# Patient Record
Sex: FEMALE | Race: BLACK OR AFRICAN AMERICAN | NOT HISPANIC OR LATINO | Employment: FULL TIME | URBAN - METROPOLITAN AREA
[De-identification: names, ages, dates, MRNs, and addresses within clinical notes are randomized per-mention and may not be internally consistent; named-entity substitution may affect disease eponyms.]

---

## 2023-11-29 DIAGNOSIS — Z76.0 MEDICATION REFILL: ICD-10-CM

## 2023-11-29 RX ORDER — IBUPROFEN 800 MG/1
800 TABLET ORAL EVERY 8 HOURS
COMMUNITY
Start: 2013-02-15 | End: 2023-11-29 | Stop reason: SDUPTHER

## 2023-11-29 RX ORDER — IBUPROFEN 800 MG/1
800 TABLET ORAL EVERY 8 HOURS
Qty: 90 TABLET | Refills: 3 | Status: SHIPPED | OUTPATIENT
Start: 2023-11-29 | End: 2024-11-28

## 2023-11-29 NOTE — TELEPHONE ENCOUNTER
Pt lvm requesting rx refill on ibuprofen 800 also requesting something for indigestion last ov 7/6/2023

## 2023-12-11 ENCOUNTER — TELEPHONE (OUTPATIENT)
Dept: PRIMARY CARE | Facility: CLINIC | Age: 43
End: 2023-12-11

## 2023-12-11 DIAGNOSIS — M54.50 ACUTE LOW BACK PAIN, UNSPECIFIED BACK PAIN LATERALITY, UNSPECIFIED WHETHER SCIATICA PRESENT: Primary | ICD-10-CM

## 2023-12-11 RX ORDER — CYCLOBENZAPRINE HCL 10 MG
10 TABLET ORAL NIGHTLY PRN
Qty: 30 TABLET | Refills: 1 | Status: SHIPPED | OUTPATIENT
Start: 2023-12-11 | End: 2024-02-28

## 2023-12-11 NOTE — TELEPHONE ENCOUNTER
PT wants to see if Dr. Ramírez could send a refill on her flexeril or send a different muscle relaxer.  Urgent care gave her a muscle relaxer and steroid, and she is not having any relief after 4 days. Please advise.

## 2023-12-29 ENCOUNTER — OFFICE VISIT (OUTPATIENT)
Dept: PRIMARY CARE | Facility: CLINIC | Age: 43
End: 2023-12-29
Payer: COMMERCIAL

## 2023-12-29 VITALS
BODY MASS INDEX: 48.3 KG/M2 | SYSTOLIC BLOOD PRESSURE: 126 MMHG | DIASTOLIC BLOOD PRESSURE: 78 MMHG | TEMPERATURE: 96.7 F | WEIGHT: 293 LBS | HEART RATE: 80 BPM | OXYGEN SATURATION: 99 %

## 2023-12-29 DIAGNOSIS — F41.9 ANXIETY: Primary | ICD-10-CM

## 2023-12-29 DIAGNOSIS — Z13.6 SCREENING FOR HEART DISEASE: ICD-10-CM

## 2023-12-29 PROCEDURE — 3008F BODY MASS INDEX DOCD: CPT | Performed by: INTERNAL MEDICINE

## 2023-12-29 PROCEDURE — 99214 OFFICE O/P EST MOD 30 MIN: CPT | Performed by: INTERNAL MEDICINE

## 2023-12-29 PROCEDURE — 1036F TOBACCO NON-USER: CPT | Performed by: INTERNAL MEDICINE

## 2023-12-29 RX ORDER — BUSPIRONE HYDROCHLORIDE 7.5 MG/1
7.5 TABLET ORAL EVERY 12 HOURS
COMMUNITY
Start: 2023-07-06 | End: 2023-12-29 | Stop reason: DRUGHIGH

## 2023-12-29 RX ORDER — BUSPIRONE HYDROCHLORIDE 10 MG/1
10 TABLET ORAL 2 TIMES DAILY
Qty: 60 TABLET | Refills: 3 | Status: SHIPPED | OUTPATIENT
Start: 2023-12-29 | End: 2024-01-26 | Stop reason: DRUGHIGH

## 2023-12-29 ASSESSMENT — PAIN SCALES - GENERAL: PAINLEVEL: 3

## 2023-12-29 NOTE — PROGRESS NOTES
Subjective   Patient ID: Caity Pastrana is a 43 y.o. female who presents for Follow-up.    This is a 43 y.o. presenting for follow up of anxiety. She would like to increase her dose of Buspirone. She states that she worries about her finances and her children often. She is seeing a therapist and feels this is helping.  Pt also states that she had midsternal CP and went to the  for evaluation. She had an EKG that was normal. She denies burning CP. No diaphoresis, SOB, nausea, vomiting, dizziness.         Review of Systems   All other systems reviewed and are negative.      Objective   /78   Pulse 80   Temp 35.9 °C (96.7 °F)   Wt 142 kg (313 lb)   SpO2 99%   BMI 48.30 kg/m²     Physical Exam  Vitals reviewed.   Constitutional:       Appearance: Normal appearance.   Cardiovascular:      Rate and Rhythm: Normal rate and regular rhythm.      Heart sounds: Normal heart sounds.   Pulmonary:      Effort: Pulmonary effort is normal.      Breath sounds: Normal breath sounds.   Skin:     General: Skin is warm and dry.   Neurological:      General: No focal deficit present.      Mental Status: She is alert and oriented to person, place, and time.   Psychiatric:         Mood and Affect: Mood normal.         Behavior: Behavior normal.         Thought Content: Thought content normal.         Judgment: Judgment normal.         Assessment/Plan   Diagnoses and all orders for this visit:  Anxiety  -     busPIRone (Buspar) 10 mg tablet; Take 1 tablet (10 mg) by mouth 2 times a day.  Screening for heart disease  -     CT cardiac scoring wo IV contrast; Future

## 2024-01-12 ENCOUNTER — ANCILLARY PROCEDURE (OUTPATIENT)
Dept: RADIOLOGY | Facility: CLINIC | Age: 44
End: 2024-01-12
Payer: COMMERCIAL

## 2024-01-12 DIAGNOSIS — Z13.6 SCREENING FOR HEART DISEASE: ICD-10-CM

## 2024-01-12 PROCEDURE — 75571 CT HRT W/O DYE W/CA TEST: CPT

## 2024-01-23 ENCOUNTER — E-VISIT (OUTPATIENT)
Dept: PRIMARY CARE | Facility: CLINIC | Age: 44
End: 2024-01-23
Payer: COMMERCIAL

## 2024-01-23 NOTE — TELEPHONE ENCOUNTER
Calf pain and being sedentary is concerning for blood clots. You may need an ultrasound, but you would need in person evaluation.

## 2024-01-24 ENCOUNTER — TELEPHONE (OUTPATIENT)
Dept: OBSTETRICS AND GYNECOLOGY | Facility: CLINIC | Age: 44
End: 2024-01-24

## 2024-01-24 ENCOUNTER — OFFICE VISIT (OUTPATIENT)
Dept: PRIMARY CARE | Facility: CLINIC | Age: 44
End: 2024-01-24
Payer: COMMERCIAL

## 2024-01-24 ENCOUNTER — HOSPITAL ENCOUNTER (OUTPATIENT)
Dept: RADIOLOGY | Facility: HOSPITAL | Age: 44
Discharge: HOME | End: 2024-01-24
Payer: COMMERCIAL

## 2024-01-24 VITALS
OXYGEN SATURATION: 99 % | BODY MASS INDEX: 49.07 KG/M2 | HEART RATE: 78 BPM | TEMPERATURE: 97.1 F | DIASTOLIC BLOOD PRESSURE: 78 MMHG | WEIGHT: 293 LBS | SYSTOLIC BLOOD PRESSURE: 126 MMHG

## 2024-01-24 DIAGNOSIS — M79.661 BILATERAL CALF PAIN: ICD-10-CM

## 2024-01-24 DIAGNOSIS — M79.661 BILATERAL CALF PAIN: Primary | ICD-10-CM

## 2024-01-24 DIAGNOSIS — M79.662 BILATERAL CALF PAIN: ICD-10-CM

## 2024-01-24 DIAGNOSIS — M79.662 BILATERAL CALF PAIN: Primary | ICD-10-CM

## 2024-01-24 PROCEDURE — 99213 OFFICE O/P EST LOW 20 MIN: CPT | Performed by: INTERNAL MEDICINE

## 2024-01-24 PROCEDURE — 93970 EXTREMITY STUDY: CPT

## 2024-01-24 PROCEDURE — 3008F BODY MASS INDEX DOCD: CPT | Performed by: INTERNAL MEDICINE

## 2024-01-24 PROCEDURE — 1036F TOBACCO NON-USER: CPT | Performed by: INTERNAL MEDICINE

## 2024-01-24 ASSESSMENT — PAIN SCALES - GENERAL: PAINLEVEL: 0-NO PAIN

## 2024-01-24 ASSESSMENT — PATIENT HEALTH QUESTIONNAIRE - PHQ9
1. LITTLE INTEREST OR PLEASURE IN DOING THINGS: NOT AT ALL
2. FEELING DOWN, DEPRESSED OR HOPELESS: NOT AT ALL
SUM OF ALL RESPONSES TO PHQ9 QUESTIONS 1 AND 2: 0

## 2024-01-24 NOTE — TELEPHONE ENCOUNTER
"Est pt called c/o bloating, lower back/pelvic pain, \"period like sxs\" also dark/spot/discharge, pt states she feels like she's on her period but its not a period and she hasn't had one in over a year. Pt states she assumed she was post carlyle, Pt states been consistent last 2 weeks. MA made appt for follow up for 2/28 with SG, pt advised to monitor and keep track of everything in the meantime, excessive vb/pain advised ER. Please advise  "

## 2024-01-24 NOTE — PROGRESS NOTES
Subjective   Patient ID: Caity Pastrana is a 43 y.o. female who presents for calves check for blood clots.    This is a 43 y.o. presenting with burning pain in her calves since last week. 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 Pt denies CP, SOB, leg swelling.         Review of Systems   All other systems reviewed and are negative.      Objective   /78   Pulse 78   Temp 36.2 °C (97.1 °F)   Wt 144 kg (318 lb)   SpO2 99%   BMI 49.07 kg/m²     Physical Exam  Constitutional:       Appearance: Normal appearance.   Cardiovascular:      Rate and Rhythm: Normal rate and regular rhythm.   Pulmonary:      Effort: Pulmonary effort is normal.      Breath sounds: Normal breath sounds.   Musculoskeletal:         General: Tenderness present. No swelling.      Comments: Tenderness in lateral aspect of right calf   Neurological:      General: No focal deficit present.      Mental Status: She is alert and oriented to person, place, and time.   Psychiatric:         Mood and Affect: Mood normal.         Assessment/Plan   Diagnoses and all orders for this visit:  Bilateral calf pain  -     Vascular US lower extremity venous duplex bilateral; Future

## 2024-01-26 ENCOUNTER — E-VISIT (OUTPATIENT)
Dept: PRIMARY CARE | Facility: CLINIC | Age: 44
End: 2024-01-26
Payer: COMMERCIAL

## 2024-01-26 DIAGNOSIS — F41.9 ANXIETY: Primary | ICD-10-CM

## 2024-01-26 DIAGNOSIS — J30.2 SEASONAL ALLERGIES: ICD-10-CM

## 2024-01-26 RX ORDER — AZELASTINE 1 MG/ML
1 SPRAY, METERED NASAL 2 TIMES DAILY PRN
Qty: 30 ML | Refills: 5 | Status: SHIPPED | OUTPATIENT
Start: 2024-01-26 | End: 2025-01-25

## 2024-01-26 RX ORDER — BUSPIRONE HYDROCHLORIDE 7.5 MG/1
7.5 TABLET ORAL 2 TIMES DAILY
Qty: 60 TABLET | Refills: 3 | Status: SHIPPED | OUTPATIENT
Start: 2024-01-26 | End: 2025-01-25

## 2024-02-16 PROCEDURE — 87086 URINE CULTURE/COLONY COUNT: CPT

## 2024-02-17 ENCOUNTER — LAB REQUISITION (OUTPATIENT)
Dept: LAB | Facility: HOSPITAL | Age: 44
End: 2024-02-17
Payer: COMMERCIAL

## 2024-02-17 DIAGNOSIS — R30.0 DYSURIA: ICD-10-CM

## 2024-02-18 LAB — BACTERIA UR CULT: NORMAL

## 2024-02-23 ENCOUNTER — E-VISIT (OUTPATIENT)
Dept: PRIMARY CARE | Facility: CLINIC | Age: 44
End: 2024-02-23
Payer: COMMERCIAL

## 2024-02-23 DIAGNOSIS — J01.90 ACUTE NON-RECURRENT SINUSITIS, UNSPECIFIED LOCATION: Primary | ICD-10-CM

## 2024-02-23 RX ORDER — CEPHALEXIN 500 MG/1
500 CAPSULE ORAL 2 TIMES DAILY
Qty: 20 CAPSULE | Refills: 0 | Status: SHIPPED | OUTPATIENT
Start: 2024-02-23 | End: 2024-03-05

## 2024-02-27 ASSESSMENT — ENCOUNTER SYMPTOMS
CHILLS: 0
FEVER: 0
HEMATURIA: 0
ANOREXIA: 0
ABDOMINAL PAIN: 0
BACK PAIN: 0
DIARRHEA: 0
FREQUENCY: 0
CONSTIPATION: 0
NAUSEA: 0
HEADACHES: 1
VOMITING: 0
SORE THROAT: 0
FLANK PAIN: 0
DYSURIA: 1

## 2024-02-28 ENCOUNTER — OFFICE VISIT (OUTPATIENT)
Dept: OBSTETRICS AND GYNECOLOGY | Facility: CLINIC | Age: 44
End: 2024-02-28
Payer: COMMERCIAL

## 2024-02-28 ENCOUNTER — HOSPITAL ENCOUNTER (OUTPATIENT)
Dept: RADIOLOGY | Facility: CLINIC | Age: 44
Discharge: HOME | End: 2024-02-28
Payer: COMMERCIAL

## 2024-02-28 ENCOUNTER — LAB (OUTPATIENT)
Dept: LAB | Facility: LAB | Age: 44
End: 2024-02-28
Payer: COMMERCIAL

## 2024-02-28 VITALS
BODY MASS INDEX: 44.41 KG/M2 | SYSTOLIC BLOOD PRESSURE: 118 MMHG | WEIGHT: 293 LBS | DIASTOLIC BLOOD PRESSURE: 84 MMHG | HEIGHT: 68 IN

## 2024-02-28 DIAGNOSIS — N95.0 PMB (POSTMENOPAUSAL BLEEDING): Primary | ICD-10-CM

## 2024-02-28 DIAGNOSIS — Z01.411 ENCOUNTER FOR GYNECOLOGICAL EXAMINATION WITH ABNORMAL FINDING: ICD-10-CM

## 2024-02-28 DIAGNOSIS — Z80.3 FAMILY HISTORY OF BREAST CANCER: ICD-10-CM

## 2024-02-28 DIAGNOSIS — N95.0 PMB (POSTMENOPAUSAL BLEEDING): ICD-10-CM

## 2024-02-28 DIAGNOSIS — Z13.79 ENCOUNTER FOR OTHER SCREENING FOR GENETIC AND CHROMOSOMAL ANOMALIES: ICD-10-CM

## 2024-02-28 DIAGNOSIS — Z11.51 ENCOUNTER FOR SCREENING FOR HUMAN PAPILLOMAVIRUS (HPV): ICD-10-CM

## 2024-02-28 DIAGNOSIS — Z80.41 FAMILY HISTORY OF OVARIAN CANCER: ICD-10-CM

## 2024-02-28 DIAGNOSIS — Z12.4 CERVICAL CANCER SCREENING: ICD-10-CM

## 2024-02-28 DIAGNOSIS — Z12.31 ENCOUNTER FOR SCREENING MAMMOGRAM FOR MALIGNANT NEOPLASM OF BREAST: ICD-10-CM

## 2024-02-28 LAB — TSH SERPL DL<=0.05 MIU/L-ACNC: 1.35 MIU/L (ref 0.27–4.2)

## 2024-02-28 PROCEDURE — 76856 US EXAM PELVIC COMPLETE: CPT

## 2024-02-28 PROCEDURE — 84443 ASSAY THYROID STIM HORMONE: CPT

## 2024-02-28 PROCEDURE — 83002 ASSAY OF GONADOTROPIN (LH): CPT

## 2024-02-28 PROCEDURE — 1036F TOBACCO NON-USER: CPT | Performed by: OBSTETRICS & GYNECOLOGY

## 2024-02-28 PROCEDURE — 88175 CYTOPATH C/V AUTO FLUID REDO: CPT | Mod: TC,GCY | Performed by: OBSTETRICS & GYNECOLOGY

## 2024-02-28 PROCEDURE — 87205 SMEAR GRAM STAIN: CPT | Mod: WESLAB | Performed by: OBSTETRICS & GYNECOLOGY

## 2024-02-28 PROCEDURE — 36415 COLL VENOUS BLD VENIPUNCTURE: CPT

## 2024-02-28 PROCEDURE — 87624 HPV HI-RISK TYP POOLED RSLT: CPT | Performed by: OBSTETRICS & GYNECOLOGY

## 2024-02-28 PROCEDURE — 83001 ASSAY OF GONADOTROPIN (FSH): CPT

## 2024-02-28 PROCEDURE — 99396 PREV VISIT EST AGE 40-64: CPT | Performed by: OBSTETRICS & GYNECOLOGY

## 2024-02-28 PROCEDURE — 99213 OFFICE O/P EST LOW 20 MIN: CPT | Performed by: OBSTETRICS & GYNECOLOGY

## 2024-02-28 RX ORDER — MISOPROSTOL 200 UG/1
200 TABLET ORAL ONCE
Qty: 1 TABLET | Refills: 0 | Status: SHIPPED | OUTPATIENT
Start: 2024-02-28 | End: 2024-03-05

## 2024-02-28 RX ORDER — LIRAGLUTIDE 6 MG/ML
1.8 INJECTION SUBCUTANEOUS
COMMUNITY
Start: 2023-11-10 | End: 2024-11-09

## 2024-02-28 ASSESSMENT — ENCOUNTER SYMPTOMS
LOSS OF SENSATION IN FEET: 0
OCCASIONAL FEELINGS OF UNSTEADINESS: 0
DEPRESSION: 0

## 2024-02-28 ASSESSMENT — PATIENT HEALTH QUESTIONNAIRE - PHQ9
1. LITTLE INTEREST OR PLEASURE IN DOING THINGS: NOT AT ALL
SUM OF ALL RESPONSES TO PHQ9 QUESTIONS 1 & 2: 0
2. FEELING DOWN, DEPRESSED OR HOPELESS: NOT AT ALL

## 2024-02-28 ASSESSMENT — PAIN SCALES - GENERAL: PAINLEVEL: 1

## 2024-02-28 ASSESSMENT — LIFESTYLE VARIABLES
HOW OFTEN DO YOU HAVE A DRINK CONTAINING ALCOHOL: 2-4 TIMES A MONTH
HOW OFTEN DO YOU HAVE SIX OR MORE DRINKS ON ONE OCCASION: NEVER
HOW MANY STANDARD DRINKS CONTAINING ALCOHOL DO YOU HAVE ON A TYPICAL DAY: 1 OR 2
AUDIT-C TOTAL SCORE: 2
SKIP TO QUESTIONS 9-10: 1

## 2024-02-28 NOTE — PROGRESS NOTES
GYN OFFICE VISIT    Patient Name:  Caity Pastrana  :  1980  MR #:  [id number]  Acct #:  [id number]      ASSESSMENT/PLAN:   1. PMB (postmenopausal bleeding)    - US PELVIS TRANSABDOMINAL WITH TRANSVAGINAL; Future  - FSH; Future  - Luteinizing Hormone; Future  - TSH with reflex to Free T4 if abnormal; Future  - miSOPROStoL (Cytotec) 200 mcg tablet; Insert 1 tablet (200 mcg) into the vagina 1 time for 1 dose. Night before procedure  Dispense: 1 tablet; Refill: 0  - Vaginitis Gram Stain For Bacterial Vaginosis + Yeast    2. Encounter for gynecological examination with abnormal finding      3. Family history of ovarian cancer    - CancerNext; Future    4. Family history of breast cancer    - CancerNext; Future    5. Encounter for other screening for genetic and chromosomal anomalies    - CancerNext; Future    6. Cervical cancer screening    - THINPREP PAP TEST  - HPV DNA High Risk With Genotype    7. Encounter for screening for human papillomavirus (HPV)    - THINPREP PAP TEST  - HPV DNA High Risk With Genotype    8. Encounter for screening mammogram for malignant neoplasm of breast    - BI mammo bilateral screening tomosynthesis; Future   No follow-ups on file.    Mammogram yearly  Pap Q3-5 years as indicated, due now  Colonoscopy at 45 yoa, then every 10 yrs if normal.  Osteoporosis screen at 65 yoa then as indicated based on results.       Chief Complaint   Patient presents with    Vaginal Bleeding     Caity Pastrana is a 43 y.o. C. Female who presents for abnormal vaginal bleeding early Feb, lasted 3-4 weeks,, light flow, pantyliner. Patient has been postmenopausal since 37 yoa.    Answers submitted by the patient for this visit:  Female Genital Questionnaire (Submitted on 2024)  Chief Complaint: Female genitourinary complaint  genital itching: Yes  vaginal bleeding: Yes  vaginal discharge: Yes  Chronicity: recurrent  Onset: 1 to 4 weeks ago  Frequency: intermittently  Progression since onset: gradually  improving  Severity of pain: no pain  Affected side: both  Pregnant now?: No  abdominal pain: No  anorexia: No  back pain: No  chills: No  constipation: No  diarrhea: No  discolored urine: No  dysuria: Yes  fever: No  flank pain: No  frequency: No  headaches: Yes  hematuria: No  joint pain: No  joint swelling: No  nausea: No  painful intercourse: No  rash: No  sore throat: No  urgency: No  vomiting: No  Aggravated by: nothing  Sexual activity: not sexually active  Partner with STD symptoms: no  Birth control: nothing, abstinence, tubal ligation  Menstrual history: postmenopausal  Vaginal bleeding: spotting  Passing clots?: No  Passing tissue?: No  Discharge characteristics: bloody, brown, scant, thick    Past Medical History:   Diagnosis Date    CTS (carpal tunnel syndrome)     Depression     Migraine     Personal history of other diseases of the digestive system     History of esophageal reflux    Personal history of other diseases of urinary system     History of bladder problems    Personal history of other mental and behavioral disorders     History of depression    Urinary tract infection        Social History     Tobacco Use    Smoking status: Never    Smokeless tobacco: Never   Vaping Use    Vaping Use: Never used   Substance Use Topics    Alcohol use: Yes     Alcohol/week: 1.0 standard drink of alcohol     Types: 1 Glasses of wine per week     Comment: Social drinker    Drug use: Never        Family History   Problem Relation Name Age of Onset    Arthritis Mother Nazia Pastrana     Breast cancer Mother Nazia Pastrana     Other (Ovarian cancer) Mother Nazia Pastrana     Diabetes Mother Nazia Pastrana     Mental illness Mother Nazia Pastrana          Current Outpatient Medications:     azelastine (Astelin) 137 mcg (0.1 %) nasal spray, Administer 1 spray into each nostril 2 times a day as needed for rhinitis or allergies. Use in each nostril as directed, Disp: 30 mL, Rfl: 5    busPIRone (Buspar) 7.5 mg tablet, Take 1 tablet  (7.5 mg) by mouth 2 times a day., Disp: 60 tablet, Rfl: 3    cephalexin (Keflex) 500 mg capsule, Take 1 capsule (500 mg) by mouth 2 times a day for 10 days., Disp: 20 capsule, Rfl: 0    cyclobenzaprine (Flexeril) 10 mg tablet, Take 1 tablet (10 mg) by mouth as needed at bedtime for muscle spasms., Disp: 30 tablet, Rfl: 1    ibuprofen 800 mg tablet, Take 1 tablet (800 mg) by mouth every 8 hours., Disp: 90 tablet, Rfl: 3    liraglutide (Victoza 2-Christo) 0.6 mg/0.1 mL (18 mg/3 mL) injection, Inject 0.3 mL (1.8 mg) under the skin once daily., Disp: , Rfl:     miSOPROStoL (Cytotec) 200 mcg tablet, Insert 1 tablet (200 mcg) into the vagina 1 time for 1 dose. Night before procedure, Disp: 1 tablet, Rfl: 0     Allergies   Allergen Reactions    Metformin Blurred vision and Headache    Nickel Itching    Sulfa (Sulfonamide Antibiotics) Unknown    Tramadol Unknown    Bee Pollen Rash       ROS:  W[facility] - GENERAL:          Chills no.  Fever no.  Loss of Weight no.  Sweats no.  Fatigue no.  Weight gain yes  [facility] - RESPIRATORY:          Shortness of breath no.    [facility] - CARDIOVASCULAR:          Chest Pain no.  High Blood Pressure no.  Irregular Heart Beat no.  Low Blood Pressure no.  Rapid Heart Beat no.  Swelling of ankles no.    [facility] - GASTROINTESTINAL:          Appetite Poor no.  Bloating no.  Constipation no.  Diarrhea no.  Hemorrhoids no.  Indigestion no.  Nausea no.  Rectal Bleeding no.  Stomach Pain no  Vomiting no.  Vomiting Blood no.      [facility] - GENITO-URINARY:          Blood in Urine no.  Frequent Urination no.  Lack of Bladder Control no.  Painful Urination no.  Vaginal bleeding- yes Vaginal discharge no.  Vaginal odor no.  Vaginal itching no.  Post-coital bleeding no.      [facility] - MUSCLE/JOINT/BONE:          Back yes Joint pain yes.  Muscle pain no.      [facility] - SKIN:          Bruise easily no.  Itching no.  Change in Moles no.  Rash no.  Sore that won't heal no.  Vulvar Lesions no.  Acne no acute problems.      W[facility] - ROS Update:     "      Depression or anxiety problems no.        OBJECTIVE:   /84   Ht 1.715 m (5' 7.5\")   Wt 145 kg (319 lb)   BMI 49.23 kg/m²   Body mass index is 49.23 kg/m².     Physical Exam  GENERAL:   General Appearance:  Pleasantly obese; no functional handicap, well-groomed.  Hygiene:  good.  Ill-appearance:  none.    HEENT: NC/AT head.  Neck is supple.  Speech:  clear.  Eye contact:  normal.  LUNGS:  Normal effort; no respiratory distress.    HEART: RRR with S1/S2 w/o M/C/R  ABDOMEN: Tenderness:  none.  Distention:  none.   GENITOURINARY - FEMALE: Bladder:  normal.  Pelvic support defects:  not seen .  External genitalia:  Normal.  Urethra:  Normal.  Vagina:  no lesions, moderate discharge, Cervix/ cuff:  normal appearance .  Uterus:  Difficult BME due to body habitus, non tender.  Adnexa:  normal , non tender.   DERMATOLOGY:  Skin:  no acute lesions.  Chronic changes  EXTREMITIES: Normal:  no anomalies.  Edema:  none.    NEUROLOGICAL: Orientation:  alert and oriented x 3.   PSYCHOLOGY: Affect:  appropriate.  Mood:  pleasant.     Previous/current LABS reviewed: Yes  Previous/current RADIOLOGY reviewed: Yes    Note: This dictation was generated using Dragon voice recognition software. Please excuse any grammatical or spelling errors that may have occurred using the system.    C    "

## 2024-02-29 LAB
CLUE CELLS VAG LPF-#/AREA: NORMAL /[LPF]
FSH SERPL-ACNC: 24 IU/L
LH SERPL-ACNC: 9.1 IU/L
NUGENT SCORE: 1
YEAST VAG WET PREP-#/AREA: NORMAL

## 2024-03-03 PROBLEM — Z80.41 FAMILY HISTORY OF OVARIAN CANCER: Status: ACTIVE | Noted: 2024-03-03

## 2024-03-03 PROBLEM — Z80.3 FAMILY HISTORY OF BREAST CANCER: Status: ACTIVE | Noted: 2024-03-03

## 2024-03-05 ENCOUNTER — PROCEDURE VISIT (OUTPATIENT)
Dept: OBSTETRICS AND GYNECOLOGY | Facility: CLINIC | Age: 44
End: 2024-03-05
Payer: COMMERCIAL

## 2024-03-05 VITALS
DIASTOLIC BLOOD PRESSURE: 70 MMHG | HEIGHT: 68 IN | SYSTOLIC BLOOD PRESSURE: 117 MMHG | WEIGHT: 293 LBS | BODY MASS INDEX: 44.41 KG/M2

## 2024-03-05 DIAGNOSIS — Z80.3 FAMILY HISTORY OF BREAST CANCER: ICD-10-CM

## 2024-03-05 DIAGNOSIS — N95.0 PMB (POSTMENOPAUSAL BLEEDING): ICD-10-CM

## 2024-03-05 DIAGNOSIS — Z80.41 FAMILY HISTORY OF OVARIAN CANCER: ICD-10-CM

## 2024-03-05 DIAGNOSIS — N95.9 MENOPAUSAL AND PERIMENOPAUSAL DISORDER: Primary | ICD-10-CM

## 2024-03-05 PROCEDURE — 88305 TISSUE EXAM BY PATHOLOGIST: CPT | Performed by: PATHOLOGY

## 2024-03-05 PROCEDURE — 58558 HYSTEROSCOPY BIOPSY: CPT | Performed by: OBSTETRICS & GYNECOLOGY

## 2024-03-05 PROCEDURE — 88305 TISSUE EXAM BY PATHOLOGIST: CPT | Mod: TC | Performed by: OBSTETRICS & GYNECOLOGY

## 2024-03-05 ASSESSMENT — PAIN SCALES - GENERAL: PAINLEVEL: 1

## 2024-03-05 NOTE — PROGRESS NOTES
Patient ID: Caity Pastrana is a 43 y.o. female.    Endometrial biopsy    Date/Time: 3/5/2024 6:42 PM    Performed by: Trisha Borjas DO  Authorized by: Trisha Borjas DO    Consent:     Consent obtained: written    Consent given by: patient    Risks discussed: bleeding, infection, need for repeat procedure, pain and conversion to surgery    Alternatives discussed: observation and alternative treatment    Patient agrees, verbalizes understanding, and wants to proceed: yes    Indications:     Indications: abnormal uterine bleeding      Progression of post-menopausal bleeding: unable to specify    Progression of post-menopausal bleeding comment: Patient is likely perimenopausal with alternating periods of amenorrhea followed by bleeding in times of elevated ovarian function tests, to borderline to normal testing (FSH and LH).     Pre-procedure:     Urine pregnancy test: N/A      Premeds: acetaminophen and NSAID    Procedure:     A bimanual exam was performed: no      A bimanual exam was performed comment: U/S results reviewed again. Tilted uterus >8cm with 0.7cm stripe and up to 1.4cm polyp.    Prepped with: Betadine    Tenaculum used: yes      A local block was performed: yes      Block method: paracervical block      Local anesthetic: lidocaine 2% w/o epi    Amount used (mL): 5    Cervix dilated: yes      Number of passes: 5  Findings:     Cervix: normal      Uterus depth by sound (cm): 8    Specimen collected: specimen collected and sent to pathology      Patient tolerance: tolerated well, no immediate complications  Comments:     Procedure comments: 43 AAF here for her elected diagnostic procedure.  After consent, the patient was prepped and draped in the normal sterile fashion in the dorsal lithotomy position. The patient voided before the procedure.  Following hysteroscopic exam,  5 passes of endometrial pipelles used to obtain endometrial sampling and remaining polypoid tissue.  The final clockwise  sampling was performed with Kevorkian  curette. She tolerated the procedure very well.  The Endosee was then discontinued and attention was turned to endometrial curettings (see procedure note).  Pelvic rest advised for 1 week.   Hysteroscopy polypectomy with EMB    Date/Time: 3/5/2024 1:53 PM    Performed by: Trisha Borjas DO  Authorized by: Trisha Borjas DO    Consent:     Consent obtained:  Written    Consent given by:  Patient    Risks, benefits, and alternatives were discussed: yes      Risks discussed:  Bleeding, infection, pain and incomplete drainage    Alternatives discussed:  No treatment, alternative treatment and observation  Universal protocol:     Procedure explained and questions answered to patient or proxy's satisfaction: yes      Relevant documents present and verified: yes      Test results available: yes      Imaging studies available: yes      Required blood products, implants, devices, and special equipment available: yes      Site/side marked: no      Immediately prior to procedure, a time out was called: yes      Patient identity confirmed:  Verbally with patient  Indications:     Indications:  Abnormal perimenopausal/menopausal bleeding  Pre-procedure details:     Skin preparation:  Povidone-iodine    Preparation: Patient was prepped and draped in the usual sterile fashion    Sedation:     Sedation type:  None  Anesthesia:     Anesthesia method:  Topical application, local infiltration and nerve block (Paracervical)    Topical anesthesia: Hurricaine spray.    Local anesthetic:  Lidocaine 2% w/o epi    Block location:  Paracervical    Needle gauge: [place], 22.    Block injection procedure:  Anatomic landmarks identified, negative aspiration for blood and introduced needle    Block outcome:  Anesthesia achieved  Procedure specific details:      F here for her elected diagnostic procedure.  After consent, the patient was prepped and draped in the normal sterile fashion in the dorsal  lithotomy position. The patient voided before the procedure. The Graves speculum was inserted and the cervix was  sprayed with Hurricaine spray,  then grasped at 12:00 with a tenaculum.  A paracervical block was administered by usual technique.  The cervix was then dilated gently to allow the insertion of Endosee.  Normal saline was instilled via a 500 cubic centimeters saline bag on gravity hanging on IV pole.   A survey revealed  findings consistent with ultrasound:   The ostia seen bilaterally. Polypoid structure seen right side to lower left above uterocervical junction. Attempted polypectomy via the operative channel performed -- some difficulty encountered by abdominal pressure.  Both legs flexed to allow for more distention of cavity. Following this technique the specimen was handed off and the scope was removed as the fluid was allowed to escape.   Photos were taken and a video recording performed.  The Endosee was then discontinued and attention was turned to endometrial sampling (see procedure note).  Bleeding from tenaculum sites had to be treated with AgNO3 and pressure.  Post-procedure details:     Procedure completion:  Tolerated

## 2024-03-07 LAB
LABORATORY COMMENT REPORT: NORMAL
PATH REPORT.FINAL DX SPEC: NORMAL
PATH REPORT.GROSS SPEC: NORMAL
PATH REPORT.RELEVANT HX SPEC: NORMAL
PATH REPORT.TOTAL CANCER: NORMAL

## 2024-04-08 ENCOUNTER — TELEPHONE (OUTPATIENT)
Dept: OBSTETRICS AND GYNECOLOGY | Facility: CLINIC | Age: 44
End: 2024-04-08
Payer: COMMERCIAL

## 2024-04-08 NOTE — TELEPHONE ENCOUNTER
[facility] left  for Dr. Borjas patient, received sample for testing but no order. Please fax them an order or upload to the portal.

## 2024-04-09 NOTE — TELEPHONE ENCOUNTER
[facility] was ordered May 2023, last year. I placed another [facility] order in [facility] and they received the order.

## 2024-04-09 NOTE — TELEPHONE ENCOUNTER
The order was placed when the patient eva the sample on 2/28/2024.  I can see it in Epic.  These fax the order to them and  order in the [facility] portal.

## 2024-04-15 ENCOUNTER — OFFICE VISIT (OUTPATIENT)
Dept: PRIMARY CARE | Facility: CLINIC | Age: 44
End: 2024-04-15
Payer: COMMERCIAL

## 2024-04-15 VITALS
WEIGHT: 293 LBS | TEMPERATURE: 97.8 F | DIASTOLIC BLOOD PRESSURE: 76 MMHG | OXYGEN SATURATION: 99 % | HEART RATE: 80 BPM | BODY MASS INDEX: 49.38 KG/M2 | SYSTOLIC BLOOD PRESSURE: 128 MMHG

## 2024-04-15 DIAGNOSIS — G47.00 INSOMNIA, UNSPECIFIED TYPE: ICD-10-CM

## 2024-04-15 DIAGNOSIS — H92.03 OTALGIA OF BOTH EARS: Primary | ICD-10-CM

## 2024-04-15 PROCEDURE — 1036F TOBACCO NON-USER: CPT | Performed by: INTERNAL MEDICINE

## 2024-04-15 PROCEDURE — 99213 OFFICE O/P EST LOW 20 MIN: CPT | Performed by: INTERNAL MEDICINE

## 2024-04-15 RX ORDER — CIPROFLOXACIN AND DEXAMETHASONE 3; 1 MG/ML; MG/ML
4 SUSPENSION/ DROPS AURICULAR (OTIC) 2 TIMES DAILY
Qty: 7.5 ML | Refills: 0 | Status: SHIPPED | OUTPATIENT
Start: 2024-04-15 | End: 2024-04-22

## 2024-04-15 RX ORDER — ZOLPIDEM TARTRATE 10 MG/1
10 TABLET ORAL NIGHTLY PRN
Qty: 30 TABLET | Refills: 5 | Status: SHIPPED | OUTPATIENT
Start: 2024-04-15 | End: 2024-12-11

## 2024-04-15 RX ORDER — ZOLPIDEM TARTRATE 12.5 MG/1
12.5 TABLET, FILM COATED, EXTENDED RELEASE ORAL NIGHTLY PRN
COMMUNITY
End: 2024-04-15 | Stop reason: ALTCHOICE

## 2024-04-15 ASSESSMENT — PATIENT HEALTH QUESTIONNAIRE - PHQ9
SUM OF ALL RESPONSES TO PHQ9 QUESTIONS 1 AND 2: 0
2. FEELING DOWN, DEPRESSED OR HOPELESS: NOT AT ALL
1. LITTLE INTEREST OR PLEASURE IN DOING THINGS: NOT AT ALL

## 2024-04-15 ASSESSMENT — PAIN SCALES - GENERAL: PAINLEVEL: 0-NO PAIN

## 2024-04-15 NOTE — PROGRESS NOTES
Subjective   Patient ID: Caity Pastrana is a 44 y.o. female who presents for Earache.    This is a 44 y.o. presenting with right ear pain for a few days. She recently had a sinus infection that was treated. She has been taking Claritin and Sudafed. She will be picking up her Azelastine rx. She has had a HA. No fever, chills, ear drainage, dizziness.  Pt also needs a RF of Zolpidem.         Review of Systems   All other systems reviewed and are negative.      Objective   /76   Pulse 80   Temp 36.6 °C (97.8 °F)   Wt 145 kg (320 lb)   SpO2 99%   BMI 49.38 kg/m²     Physical Exam  Vitals reviewed.   Constitutional:       General: She is not in acute distress.     Appearance: Normal appearance. She is not ill-appearing.   HENT:      Right Ear: Tympanic membrane normal. Tenderness present.      Left Ear: Tympanic membrane normal. Tenderness present.      Ears:      Comments: Ear canals tender bilaterally     Nose: Nose normal.      Mouth/Throat:      Mouth: Mucous membranes are moist.   Cardiovascular:      Rate and Rhythm: Normal rate and regular rhythm.      Heart sounds: Normal heart sounds.   Pulmonary:      Effort: Pulmonary effort is normal.      Breath sounds: Normal breath sounds.   Neurological:      General: No focal deficit present.      Mental Status: She is alert and oriented to person, place, and time.   Psychiatric:         Mood and Affect: Mood normal.         Assessment/Plan   Diagnoses and all orders for this visit:  Otalgia of both ears  -     ciprofloxacin-dexamethasone (CiproDEX) otic suspension; Administer 4 drops into each ear 2 times a day for 7 days.  Insomnia, unspecified type  -     zolpidem (Ambien) 10 mg tablet; Take 1 tablet (10 mg) by mouth as needed at bedtime for sleep.

## 2024-04-22 ENCOUNTER — E-VISIT (OUTPATIENT)
Dept: PRIMARY CARE | Facility: CLINIC | Age: 44
End: 2024-04-22
Payer: COMMERCIAL

## 2024-04-22 NOTE — TELEPHONE ENCOUNTER
This sounds like another sinus infection, since she is not improving. She may be having post nasal drainage that is irritating her stomach. There's a lot of ATBx that she cannot tolerate, but the Cephalexin may not be working anymore. Is she able to tolerate Doxycycline?

## 2024-04-29 ENCOUNTER — LAB REQUISITION (OUTPATIENT)
Dept: LAB | Facility: HOSPITAL | Age: 44
End: 2024-04-29
Payer: COMMERCIAL

## 2024-04-29 DIAGNOSIS — R30.0 DYSURIA: ICD-10-CM

## 2024-04-29 PROCEDURE — 87086 URINE CULTURE/COLONY COUNT: CPT

## 2024-05-01 LAB — BACTERIA UR CULT: NORMAL

## 2024-05-10 ENCOUNTER — E-VISIT (OUTPATIENT)
Dept: PRIMARY CARE | Facility: CLINIC | Age: 44
End: 2024-05-10
Payer: COMMERCIAL

## 2024-05-10 ENCOUNTER — LAB (OUTPATIENT)
Dept: LAB | Facility: LAB | Age: 44
End: 2024-05-10
Payer: COMMERCIAL

## 2024-05-10 DIAGNOSIS — J32.9 CHRONIC SINUSITIS, UNSPECIFIED LOCATION: ICD-10-CM

## 2024-05-10 DIAGNOSIS — J32.9 CHRONIC SINUSITIS, UNSPECIFIED LOCATION: Primary | ICD-10-CM

## 2024-05-10 LAB
BASOPHILS # BLD AUTO: 0.04 X10*3/UL (ref 0–0.1)
BASOPHILS NFR BLD AUTO: 0.5 %
EOSINOPHIL # BLD AUTO: 0.17 X10*3/UL (ref 0–0.7)
EOSINOPHIL NFR BLD AUTO: 2.2 %
ERYTHROCYTE [DISTWIDTH] IN BLOOD BY AUTOMATED COUNT: 13.4 % (ref 11.5–14.5)
HCT VFR BLD AUTO: 39.2 % (ref 36–46)
HGB BLD-MCNC: 12.8 G/DL (ref 12–16)
IMM GRANULOCYTES # BLD AUTO: 0.01 X10*3/UL (ref 0–0.7)
IMM GRANULOCYTES NFR BLD AUTO: 0.1 % (ref 0–0.9)
LYMPHOCYTES # BLD AUTO: 2.84 X10*3/UL (ref 1.2–4.8)
LYMPHOCYTES NFR BLD AUTO: 36.5 %
MCH RBC QN AUTO: 32.2 PG (ref 26–34)
MCHC RBC AUTO-ENTMCNC: 32.7 G/DL (ref 32–36)
MCV RBC AUTO: 99 FL (ref 80–100)
MONOCYTES # BLD AUTO: 0.5 X10*3/UL (ref 0.1–1)
MONOCYTES NFR BLD AUTO: 6.4 %
NEUTROPHILS # BLD AUTO: 4.23 X10*3/UL (ref 1.2–7.7)
NEUTROPHILS NFR BLD AUTO: 54.3 %
NRBC BLD-RTO: 0 /100 WBCS (ref 0–0)
PLATELET # BLD AUTO: 277 X10*3/UL (ref 150–450)
RBC # BLD AUTO: 3.98 X10*6/UL (ref 4–5.2)
WBC # BLD AUTO: 7.8 X10*3/UL (ref 4.4–11.3)

## 2024-05-10 PROCEDURE — 36415 COLL VENOUS BLD VENIPUNCTURE: CPT

## 2024-05-10 PROCEDURE — 85025 COMPLETE CBC W/AUTO DIFF WBC: CPT

## 2024-05-10 NOTE — TELEPHONE ENCOUNTER
Macrobid does not treat sinus infections. I can order a lab (CBC) to look for infection. She will need to be seen for ENT, since her sinuses are not improving. Referral placed.

## 2024-05-13 ENCOUNTER — CLINICAL SUPPORT (OUTPATIENT)
Dept: AUDIOLOGY | Facility: CLINIC | Age: 44
End: 2024-05-13
Payer: COMMERCIAL

## 2024-05-13 DIAGNOSIS — H93.8X3 SENSATION OF FULLNESS IN BOTH EARS: Primary | ICD-10-CM

## 2024-05-13 PROCEDURE — 92550 TYMPANOMETRY & REFLEX THRESH: CPT | Performed by: AUDIOLOGIST

## 2024-05-13 PROCEDURE — 92557 COMPREHENSIVE HEARING TEST: CPT | Performed by: AUDIOLOGIST

## 2024-05-13 NOTE — PROGRESS NOTES
Chief Complaint   Patient presents with    Hearing Loss    Sinus Problem      HISTORY:  Caity Pastrana, age 44 years, was seen for audiogram prior to otolaryngology appointment.  Ms. Pastrana reports bilateral ear blockage and muffled hearing.  She has been treated with antibiotics due to recurrent sinus issues.  She notes periodic tinnitus.  There is no history of recurrent middle ear pathology, ear drainage or ear surgery.    RESULTS:  Prior to testing both external auditory canals were clear and tympanic membranes visualized    Immittance and acoustic reflexes:  Immittance testing yielded TYPE A tympanograms indicating normal middle ear function both ears  Acoustic reflexes were present 500 - 4000 Hz both ears    Audiogram:  Normal hearing levels were obtained 250 - 8000 Hz both ears  Speech reception thresholds obtained at 5 dBHL both ears  Speech discrimination scores were 100% at 40 dBHL    IMPRESSIONS:  Normal middle ear function noted both ears  Normal acoustic reflexes noted both ears  Normal hearing levels     RECOMMENDATIONS:  1.  Follow up with otolaryngology  2.  Retest hearing levels annually    time: 7692 - 2970

## 2024-05-16 PROBLEM — M19.079 ANKLE ARTHRITIS: Status: ACTIVE | Noted: 2024-05-16

## 2024-05-16 PROBLEM — N95.0 POSTMENOPAUSAL BLEEDING: Status: ACTIVE | Noted: 2024-05-16

## 2024-05-16 PROBLEM — M25.561 PAIN IN BOTH KNEES: Status: ACTIVE | Noted: 2024-05-16

## 2024-05-16 PROBLEM — N93.9 ABNORMAL UTERINE BLEEDING: Status: ACTIVE | Noted: 2024-05-16

## 2024-05-16 PROBLEM — M17.10 ARTHRITIS OF KNEE: Status: ACTIVE | Noted: 2024-05-16

## 2024-05-16 PROBLEM — G47.00 INSOMNIA: Status: ACTIVE | Noted: 2024-05-16

## 2024-05-16 PROBLEM — M25.562 PAIN IN BOTH KNEES: Status: ACTIVE | Noted: 2024-05-16

## 2024-05-16 PROBLEM — M79.661 BILATERAL CALF PAIN: Status: ACTIVE | Noted: 2024-05-16

## 2024-05-16 PROBLEM — N91.2 AMENORRHEA: Status: ACTIVE | Noted: 2024-05-16

## 2024-05-16 PROBLEM — J32.0 CHRONIC MAXILLARY SINUSITIS: Status: ACTIVE | Noted: 2024-05-16

## 2024-05-16 PROBLEM — J30.9 ALLERGIC RHINITIS: Status: ACTIVE | Noted: 2024-05-16

## 2024-05-16 PROBLEM — J31.0 CHRONIC RHINITIS: Status: ACTIVE | Noted: 2024-05-16

## 2024-05-16 PROBLEM — F41.1 GAD (GENERALIZED ANXIETY DISORDER): Status: ACTIVE | Noted: 2024-05-16

## 2024-05-16 PROBLEM — R20.9 SKIN SENSATION DISTURBANCE: Status: ACTIVE | Noted: 2024-05-16

## 2024-05-16 PROBLEM — K59.00 ACUTE CONSTIPATION: Status: ACTIVE | Noted: 2024-05-16

## 2024-05-16 PROBLEM — E55.9 VITAMIN D DEFICIENCY: Status: ACTIVE | Noted: 2024-05-16

## 2024-05-16 PROBLEM — G43.909 HEADACHE, MIGRAINE: Status: ACTIVE | Noted: 2024-05-16

## 2024-05-16 PROBLEM — N92.1 METRORRHAGIA: Status: ACTIVE | Noted: 2024-05-16

## 2024-05-16 PROBLEM — J30.2 SEASONAL ALLERGIES: Status: ACTIVE | Noted: 2024-05-16

## 2024-05-16 PROBLEM — J32.9 CHRONIC SINUSITIS: Status: ACTIVE | Noted: 2024-05-16

## 2024-05-16 PROBLEM — N32.81 OVERACTIVE BLADDER: Status: ACTIVE | Noted: 2024-05-16

## 2024-05-16 PROBLEM — F41.9 ANXIETY: Status: ACTIVE | Noted: 2024-05-16

## 2024-05-16 PROBLEM — R73.03 PREDIABETES: Status: ACTIVE | Noted: 2023-09-28

## 2024-05-16 PROBLEM — R03.0 FINDING OF ABOVE NORMAL BLOOD PRESSURE: Status: ACTIVE | Noted: 2024-05-16

## 2024-05-16 PROBLEM — M54.50 LOW BACK PAIN, UNSPECIFIED: Status: ACTIVE | Noted: 2024-05-16

## 2024-05-16 PROBLEM — J34.3 HYPERTROPHY OF BOTH INFERIOR NASAL TURBINATES: Status: ACTIVE | Noted: 2024-05-16

## 2024-05-16 PROBLEM — G89.29 CHRONIC PAIN: Status: ACTIVE | Noted: 2024-05-16

## 2024-05-16 PROBLEM — G56.01 CARPAL TUNNEL SYNDROME OF RIGHT WRIST: Status: ACTIVE | Noted: 2024-05-16

## 2024-05-16 PROBLEM — E28.319 PREMATURE MENOPAUSE: Status: ACTIVE | Noted: 2024-02-28

## 2024-05-16 PROBLEM — G47.33 OSA (OBSTRUCTIVE SLEEP APNEA): Status: ACTIVE | Noted: 2023-09-28

## 2024-05-16 PROBLEM — M79.662 BILATERAL CALF PAIN: Status: ACTIVE | Noted: 2024-05-16

## 2024-05-16 PROBLEM — K21.9 GASTROESOPHAGEAL REFLUX DISEASE: Status: ACTIVE | Noted: 2024-05-16

## 2024-05-16 PROBLEM — N64.9 BREAST DISORDER: Status: ACTIVE | Noted: 2024-05-16

## 2024-05-16 PROBLEM — E78.5 HYPERLIPIDEMIA: Status: ACTIVE | Noted: 2023-01-10

## 2024-05-16 RX ORDER — CHLORHEXIDINE GLUCONATE 40 MG/ML
SOLUTION TOPICAL DAILY PRN
COMMUNITY
Start: 2023-05-08

## 2024-05-20 ENCOUNTER — OFFICE VISIT (OUTPATIENT)
Dept: OTOLARYNGOLOGY | Facility: CLINIC | Age: 44
End: 2024-05-20
Payer: COMMERCIAL

## 2024-05-20 VITALS — HEIGHT: 68 IN | WEIGHT: 293 LBS | BODY MASS INDEX: 44.41 KG/M2

## 2024-05-20 DIAGNOSIS — J32.9 CHRONIC SINUSITIS, UNSPECIFIED LOCATION: ICD-10-CM

## 2024-05-20 DIAGNOSIS — R44.8 FACIAL PRESSURE: ICD-10-CM

## 2024-05-20 DIAGNOSIS — J34.89 NASAL LESION: ICD-10-CM

## 2024-05-20 DIAGNOSIS — J34.3 HYPERTROPHY OF BOTH INFERIOR NASAL TURBINATES: ICD-10-CM

## 2024-05-20 DIAGNOSIS — J34.89 NASAL OBSTRUCTION: ICD-10-CM

## 2024-05-20 DIAGNOSIS — J32.9 CHRONIC RHINOSINUSITIS: Primary | ICD-10-CM

## 2024-05-20 DIAGNOSIS — J34.89 NASAL DRAINAGE: ICD-10-CM

## 2024-05-20 DIAGNOSIS — R09.81 NASAL CONGESTION: ICD-10-CM

## 2024-05-20 DIAGNOSIS — H93.293 ABNORMAL AUDITORY PERCEPTION OF BOTH EARS: ICD-10-CM

## 2024-05-20 DIAGNOSIS — J34.2 DEVIATED NASAL SEPTUM: ICD-10-CM

## 2024-05-20 DIAGNOSIS — J34.89 NASAL MASS: ICD-10-CM

## 2024-05-20 PROCEDURE — 88342 IMHCHEM/IMCYTCHM 1ST ANTB: CPT

## 2024-05-20 PROCEDURE — 99204 OFFICE O/P NEW MOD 45 MIN: CPT | Performed by: STUDENT IN AN ORGANIZED HEALTH CARE EDUCATION/TRAINING PROGRAM

## 2024-05-20 PROCEDURE — 31237 NSL/SINS NDSC SURG BX POLYPC: CPT | Performed by: STUDENT IN AN ORGANIZED HEALTH CARE EDUCATION/TRAINING PROGRAM

## 2024-05-20 PROCEDURE — 1036F TOBACCO NON-USER: CPT | Performed by: STUDENT IN AN ORGANIZED HEALTH CARE EDUCATION/TRAINING PROGRAM

## 2024-05-20 PROCEDURE — 88341 IMHCHEM/IMCYTCHM EA ADD ANTB: CPT | Performed by: PATHOLOGY

## 2024-05-20 PROCEDURE — 88342 IMHCHEM/IMCYTCHM 1ST ANTB: CPT | Performed by: PATHOLOGY

## 2024-05-20 PROCEDURE — 88341 IMHCHEM/IMCYTCHM EA ADD ANTB: CPT

## 2024-05-20 PROCEDURE — 88305 TISSUE EXAM BY PATHOLOGIST: CPT | Performed by: PATHOLOGY

## 2024-05-20 PROCEDURE — 88305 TISSUE EXAM BY PATHOLOGIST: CPT

## 2024-05-20 RX ORDER — SOD CHLOR,BICARB/SQUEEZ BOTTLE
PACKET, WITH RINSE DEVICE NASAL
Qty: 1 EACH | Refills: 3 | Status: SHIPPED | OUTPATIENT
Start: 2024-05-20

## 2024-05-20 RX ORDER — FLUTICASONE PROPIONATE 50 MCG
1 SPRAY, SUSPENSION (ML) NASAL 2 TIMES DAILY
Qty: 48 G | Refills: 3 | Status: SHIPPED | OUTPATIENT
Start: 2024-05-20 | End: 2025-05-20

## 2024-05-20 NOTE — PROGRESS NOTES
HPI  44-year-old female presenting for initial evaluation of multiple ENT complaints.  Endorses multiple sinonasal issues.  Main Symptoms: Daily sinonasal symptoms include bilateral nasal congestion/obstruction, anterior and posterior nasal drainage, bilateral facial pressure along the maxillary and frontal regions more noticeable on the right.  Duration: 3 months  Laterality: Bilateral  Received 2 courses of antibiotics with no significant improvement of her symptomatology.    Patient denies facial numbness, vision changes, ansomia, dental pain,  immunotherapy.    No odynophagia/dysphagia/SOB/dyspnea/hoarseness/fevers/chills/weight loss/night sweats.    Current treatment:  Nasal Steroid: No  Sinus Rinse: No  Antihistamine: Azelastine   Prednisone: No  Other: None    Recent CT: None    Previous nasal/sinus surgery: None     In addition, the patient endorses slight subjective hearing loss for the past 2 months, symptoms occurred in the setting of a URI/sinus infection.  No other otologic complaints.    Past Medical History:   Diagnosis Date    Allergic rhinitis     CTS (carpal tunnel syndrome)     Depression     Dizziness     GERD (gastroesophageal reflux disease)     Migraine     Personal history of other diseases of the digestive system     History of esophageal reflux    Personal history of other diseases of urinary system     History of bladder problems    Personal history of other mental and behavioral disorders     History of depression    Sleep difficulties     Urinary tract infection         Past Surgical History:   Procedure Laterality Date    CHOLECYSTECTOMY  01/06/2014    Cholecystectomy    COLPOSCOPY      PLANTAR FASCIA SURGERY Right 2013    TUBAL LIGATION  01/06/2014    Tubal Ligation        Current Outpatient Medications   Medication Instructions    azelastine (Astelin) 137 mcg (0.1 %) nasal spray 1 spray, Each Nostril, 2 times daily PRN, Use in each nostril as directed    busPIRone (BUSPAR) 7.5 mg,  oral, 2 times daily    chlorhexidine (Hibiclens) 4 % external liquid Topical, Daily PRN    cyclobenzaprine (FLEXERIL) 10 mg, oral, Nightly PRN    ibuprofen 800 mg, oral, Every 8 hours    miSOPROStoL (CYTOTEC) 200 mcg, vaginal, Once, Night before procedure    Victoza 2-Christo 1.8 mg, subcutaneous, Daily RT    zolpidem (AMBIEN) 10 mg, oral, Nightly PRN        Allergies   Allergen Reactions    Amoxicillin-Pot Clavulanate GI Upset and Headache    Doxycycline Monohydrate Dizziness, GI Upset and Headache    Metformin Blurred vision and Headache    Nickel Itching    Sulfa (Sulfonamide Antibiotics) Unknown    Tramadol Unknown    Bee Pollen Rash    Levofloxacin Dizziness, GI Upset, Headache, Other and Palpitations        Review of Systems  A detailed 12 point ROS was performed and is negative except as noted in the intake form, HPI and/or Past Medical History    Physical Exam   CONSTITUTIONAL: Well developed, well nourished.  VOICE: Normal voice quality  RESPIRATION: Breathing comfortably, no stridor.  CV: No clubbing/cyanosis/edema in hands.  EYES: EOM Intact, sclera normal.  NEURO: Alert and oriented times 3, Cranial nerves V,VII intact and symmetric bilaterally.  HEAD AND FACE: Symmetric facial features, no masses or lesions, sinuses nontender to palpation.  SALIVARY GLANDS: Parotid and submandibular glands normal bilaterally.  EARS: Normal external ears, external auditory canals, and TMs to otoscopy  + NOSE: External nose midline, anterior rhinoscopy is normal with limited visualization to the anterior aspect of the interior turbinates. No lesions noted.  Bilateral inferior turbinate hypertrophy, erythematous/polypoid tissue noted along the head of the left inferior turbinate  Right dorsal septal deviation  ORAL CAVITY/OROPHARYNX/LIPS: Normal mucous membranes, normal floor of mouth/tongue/OP, no masses or lesions are noted.  PHARYNGEAL WALLS AND NASOPHARYNX: No masses noted. Mucosa appears clean and moist  NECK/LYMPH: No  LAD, no thyroid masses. Trachea palpably midline  SKIN: Neck skin is without injury  PSYCH: Alert and oriented with appropriate mood and affect     Nasal endoscopy with biopsy:  PROCEDURE NOTE    For better visualization because of septal deviation, turbinate hypertrophy nasal endoscopy was performed after verbal consent was obtained by the patient and/or guardian. Both nostrils were sprayed with a mixture of lidocaine 4% and Afrin. After a sufficient amount of time elapsed for mucosal anesthesia to take place, the nasal endoscope was advanced into the nostril.    The following areas were visualized:  Nasal passage, nasal septum, turbinates, middle meatus, nasopharynx, sinus ostia    The patient tolerated the procedure well and these structures were found to be normal except as follows:  Bilateral inferior turbinate hypertrophy, erythematous/polypoid tissue noted along the head of the left inferior turbinate   Right dorsal septal deviation  Bilateral generalized mucosal edema  No mucopurulence, polyps, nor masses seen in inferior meati, middle meati, nor sphenoethmoidal recesses bilaterally.     Biopsy of left nasal corridor (left inferior turbinate): The procedure was reviewed and explained, alternatives, risks and benefits discussed.  Consent was obtained.  The area was anesthetized with 1% lidocaine with 1 :100,000 epinephrine.  Biopsy forceps were utilized to obtain the specimen which was sent to pathology for analysis.  Hemostasis was achieved.  The patient tolerated the procedure well.    Results:   Audiogram performed 5/13/2024 reviewed showing normal hearing bilaterally.  Speech discrimination score was 100% in both ears.  Type a tympanograms.      Assessment  Chronic rhinosinusitis  Facial pressure  Nasal airway obstruction  Nasal septal deviation  Nasal drainage  Bilateral inferior turbinate hypertrophy  Left nasal lesion   Abnormal auditory perception      Plan  The patient and I discussed their current  nasal / sinus symptoms as well as several therapeutic options.  Nasal endoscopy notable for bilateral generalized mucosal edema, right dorsal septal deviation and bilateral inferior turbinate hypertrophy with erythematous polypoid tissue noted along the head of the left inferior turbinate (biopsied).  No mucopurulence noted.   I would like to begin a nasal hygiene/ medical regimen consisting of Flonase, azelastine, saline irrigations.  We will consider imaging at follow-up if there is no significant improvement of her sinonasal symptomatology.    The patient was instructed on the correct technique to administer the topical nasal spray (s) aiming at the lateral nasal wall for maximal efficacy and to avoid irritation to the septum which could lead to epistaxis. I stressed consistency of usage for maximal benefit. We discussed the rationale for the timing of this therapy and the benefits and potential adverse effects.      -Abnormal auditory perception  Audiogram performed 5/13/2024 showing normal hearing bilaterally with normal tympanograms.  Abnormal auditory perception-in the setting of a URI which has now resolved.     RTC 6w    7/2/24 Addendum   Pathology reviewed / discussed with the patient over the phone consistent with   polypoid fragments of respiratory mucosa with moderate chronic inflammation, squamous metaplasia and dilated vascular profiles. Likely inflammatory/ reactive changes. She will follow up with MILTON Mendoza-MIGUEL ANGEL.

## 2024-06-06 LAB
LAB AP ASR DISCLAIMER: NORMAL
LABORATORY COMMENT REPORT: NORMAL
PATH REPORT.FINAL DX SPEC: NORMAL
PATH REPORT.GROSS SPEC: NORMAL
PATH REPORT.RELEVANT HX SPEC: NORMAL
PATH REPORT.TOTAL CANCER: NORMAL

## 2024-07-01 ENCOUNTER — HOSPITAL ENCOUNTER (OUTPATIENT)
Dept: RADIOLOGY | Facility: HOSPITAL | Age: 44
Discharge: HOME | End: 2024-07-01
Payer: COMMERCIAL

## 2024-07-01 ENCOUNTER — APPOINTMENT (OUTPATIENT)
Dept: OTOLARYNGOLOGY | Facility: CLINIC | Age: 44
End: 2024-07-01
Payer: COMMERCIAL

## 2024-07-01 VITALS — BODY MASS INDEX: 44.41 KG/M2 | WEIGHT: 293 LBS | HEIGHT: 68 IN

## 2024-07-01 DIAGNOSIS — Z12.31 ENCOUNTER FOR SCREENING MAMMOGRAM FOR MALIGNANT NEOPLASM OF BREAST: ICD-10-CM

## 2024-07-01 PROCEDURE — 77067 SCR MAMMO BI INCL CAD: CPT | Performed by: RADIOLOGY

## 2024-07-01 PROCEDURE — 77063 BREAST TOMOSYNTHESIS BI: CPT | Performed by: RADIOLOGY

## 2024-07-01 PROCEDURE — 77067 SCR MAMMO BI INCL CAD: CPT

## 2024-07-02 ENCOUNTER — APPOINTMENT (OUTPATIENT)
Dept: OTOLARYNGOLOGY | Facility: CLINIC | Age: 44
End: 2024-07-02
Payer: COMMERCIAL

## 2024-07-02 VITALS — TEMPERATURE: 97 F | BODY MASS INDEX: 45.99 KG/M2 | HEIGHT: 67 IN | WEIGHT: 293 LBS

## 2024-07-02 DIAGNOSIS — J33.1 POLYPOID SINUS DEGENERATION: ICD-10-CM

## 2024-07-02 DIAGNOSIS — J34.89 NASAL AND SINUS DISCHARGE: ICD-10-CM

## 2024-07-02 DIAGNOSIS — J34.2 DEVIATED NASAL SEPTUM: ICD-10-CM

## 2024-07-02 DIAGNOSIS — J34.89 NASAL OBSTRUCTION: ICD-10-CM

## 2024-07-02 DIAGNOSIS — J32.8 OTHER CHRONIC SINUSITIS: Primary | ICD-10-CM

## 2024-07-02 PROCEDURE — 31231 NASAL ENDOSCOPY DX: CPT | Performed by: NURSE PRACTITIONER

## 2024-07-02 PROCEDURE — 1036F TOBACCO NON-USER: CPT | Performed by: NURSE PRACTITIONER

## 2024-07-02 PROCEDURE — 99214 OFFICE O/P EST MOD 30 MIN: CPT | Performed by: NURSE PRACTITIONER

## 2024-07-02 RX ORDER — FLUTICASONE PROPIONATE 93 UG/1
SPRAY, METERED NASAL
Qty: 16 ML | Refills: 11 | Status: SHIPPED | OUTPATIENT
Start: 2024-07-02

## 2024-07-02 NOTE — PATIENT INSTRUCTIONS
Today you were evaluated by Misty Valencia CNP.    Please follow-up in 8 weeks or sooner if needed. If you have any questions or concerns, please contact my office at [phone].     We have ordered Xhance, a steroid nasal spray, for you to begin. This medication is administered 1 spray to each nostril twice daily via a specialized delivery device that helps get the medication further into your nose.     Please watch the demonstration video at [email or web address] prior to starting the medication.    Your prescription will be filled by [facility].  They will call you and proceed with further instructions.     If you have not heard from them within 48 hours, please contact my office to assist you.    Discontinue fluticasone.  Continue azelastine.    Please see Allergy. This referral was placed today. Please discontinue all antihistamines 3 days prior to this appointment.  Please contact our scheduling and  service at [phone]. They will work with you to get your test, imaging, or other appointments scheduled that might have been ordered.

## 2024-07-02 NOTE — PROGRESS NOTES
Subjective   Patient ID: Caity Pastrana is a 44 y.o. female who presents for No chief complaint on file..  HPI  Caity Pastrana is a 44 y.o. old female   presenting with complaints of sinus and nose problems that began a few months ago. The patient describes the sinus/nose problems as gradual onset of nasal drainage. She had multiple courses of antibiotics without improvement to her symptoms. She started flonase, azelastine, and saline irrigations. Since started those, she continues with some mild headaches, nasal drainage (occasionally colored intermittently). The drainage does cause her to clear her throat often. Patient denies facial pain, periorbital edema, nasal obstruction, hoarseness, loss of taste, and loss of smell. The patient denies epistaxis. The patient has taken flonase, azelastine medications to alleviate the problem. The medications have temporarily helped. The patient has tried nasal saline irrigations. Does have a history of allergic rhinitis or allergies. They deny a history of aspirin sensitivity. They report 4-5 sinus infections per year requiring antibiotic treatment. They deny recent antibiotic usage.     History of prior nasal/sinus surgery or procedure: Denies    I have also reviewed prior note from Dr. Cassius Bang dated 5/20/24 and this is contributing to my history and assessment.     I personally reviewed the patients MRI scan images and results. I discussed the results personally with the patient. The following findings were discussed: 11/16/18: MRI Brain: Right nasal septal deviation. Inferior turbinate hypertrophy (left). Maxillary and ethmoid mucosal thickening. Partial visualization of the frontal sinuses appearing with thickening bilaterally, worse on the right.     Review of Systems  Review of systems is negative for constitutional, ophthalmological, cardiac, pulmonary, renal, gastrointestinal, musculoskeletal, mental health, endocrine, or neurologic disorders (except as listed in  the HPI, PMH, and Problem List).     Objective   Physical Exam  CONSTITUTIONAL: Vital signs reviewed. Patient appears well developed and well nourished.   GENERAL: this is a healthy appearing female who appears stated age. The patient is alert and appropriately verbally conversant without hoarseness. This patient is in no apparent distress.   FACE: The face was inspected and no cutaneous masses or lesions were visualized. There was no erythema or edema noted. Facial movement was symmetric. No skin lesions were detected. There was no sinus tenderness elicited. TMJ crepitus absent.   EYES: Extra-ocular muscle function was intact. No nystagmus was observed. Pupils were equal.   CRANIAL NERVES: Cranial nerves II, III, IV, and VI were noted to be intact via extra-ocular muscle movement testing. Cranial nerve VII noted to be intact and symmetric by facial movement. Cranial nerves IX and X noted to be intact by gag reflex and palatal movement. Cranial nerve XII noted to be intact by active and symmetric tongue movement.   NOSE: Examination of the nose revealed the nasal dorsum to be midline. Intranasal exam reveals the septum is deviated right. The inferior turbinates were hypertrophic. No masses, polyps, mucopus, or other lesion on anterior rhinoscopy. See below procedure note as applicable for further exam.  ORAL CAVITY: Examination of the oral cavity revealed no mass lesions nor infection. The palate was noted to be intact. The tongue exhibited normal mobility. Mucosa was moist without lesion. The lips were free of lesion. Gums were free of inflammation. Dentition: normal without obvious infection or inflammation  OROPHARYNX: The oral pharynx was free of mass lesion or mucosal abnormality. The palate was noted to be without lesion. The uvula was normal appearing. The tonsils were Normal.  EARS: Examination of the ears revealed that the auricles were normally formed with no lesions. The external auditory canals were  normal. The tympanic membranes were intact.  There is no inflammation visualized.   NECK: Visualization and palpation of the neck revealed no mass lesions. No skin lesions or inflammatory processes were detected. The cervical musculature was normal to palpation.   CERVICAL LYMPHATICS: There were no palpable lymph nodes in the posterior triangle, submandibular triangle, jugulodigastric region, or central neck.  RESPIRATORY: Normal inspiration and expiration and chest wall expansion, no use of accessory muscles to breathe, no stridor.  NEUROLOGICAL: Patient is ambulatory without assist. Mentation is clear. Answering questions appropriately.     Nasal / sinus endoscopy    Date/Time: 7/2/2024 8:25 AM    Performed by: JAN Mendoza  Authorized by: JAN Mendoza    Consent:     Consent obtained:  Verbal    Consent given by:  Patient    Risks discussed:  Bleeding, infection and pain    Alternatives discussed:  No treatment, observation and delayed treatment  Procedure details:     Indications: assessment of airway and sino-nasal symptoms      Medication:  Afrin and Nick-Synephrine 2%    Instrument: flexible fiberoptic nasal endoscope      Scope location: bilateral nare    Post-procedure details:     Patient tolerance of procedure:  Tolerated well, no immediate complications  Comments:      Findings: After topical decongestion with decongestant and anesthetic spray, nasal endoscopy was performed using an endoscope. The septum was deviated right. The inferior turbinates were hypertrophic and edematous anteriorly. The prior biopsy site on the head of the left inferior turbinate appears well healed.  The middle turbinates appeared healthy, the middle meatus is free of purulence, masses, lesions or polyps. The superior meatus and sphenoethmoid recess are clear bilaterally. There is clear mucoid drainage down the posterior pharynx.  The nasal passageway is patent. The nasopharynx was clear. There  were no complications and the patient tolerated the procedure well.        Assessment/Plan     Caity Pastrana is a 44 y.o. year old female with symptoms and clinical findings consistent with chronic rhinitis, possibly allergic rhinitis and chronic sinusitis. Patient will increase to Xhance and continue azelastine. She does not desire surgical procedure at this time so will defer CT Sinus until follow up if not improving. Prior MRI imaging from 2018 shows chronic pansinusitis.       Plan:  Nasal endoscopy: Findings: right dev, ITH, edematous tissue throughout  I personally reviewed the patients MRI scan images and results. I discussed the results personally with the patient. The following findings were discussed: 11/16/18: MRI Brain: Right nasal septal deviation. Inferior turbinate hypertrophy (left). Maxillary and ethmoid mucosal thickening. Partial visualization of the frontal sinuses appearing with thickening bilaterally, worse on the right.   I discussed the findings the patient and offered reassurance and counseling.  We agreed to proceed with therapeutic measures to address the issues noted above.   1. We will have the patient start a stronger steroid nasal spray to help improve nasal symptoms. Prescription for Xhance was given. I recommended the patient trial this for at least 2 month. They were instructed to use the spray 1 puff in each nostril twice daily. She was also instructed on the appropriate use of the medication, by point it towards the lateral wall of the nose/corner of the eye on the same side.  She was provided with a sample today.  2. I recommended the patient start nasal irrigations to help with irrigating out the nasal cavity. This will help clean the nasal cavity PRIOR to use of the fluticasone spray.   3. Patient was instructed to continue azelastine nasal spray.   4. Patient will follow-up in 8 weeks to assess for benefit of therapies and for further management.  All questions were answered  and patient agrees with established plan of care.

## 2024-07-25 ENCOUNTER — PATIENT MESSAGE (OUTPATIENT)
Dept: PRIMARY CARE | Facility: CLINIC | Age: 44
End: 2024-07-25
Payer: COMMERCIAL

## 2024-07-25 DIAGNOSIS — M54.50 ACUTE LOW BACK PAIN, UNSPECIFIED BACK PAIN LATERALITY, UNSPECIFIED WHETHER SCIATICA PRESENT: ICD-10-CM

## 2024-07-25 RX ORDER — CYCLOBENZAPRINE HCL 10 MG
10 TABLET ORAL NIGHTLY PRN
Qty: 30 TABLET | Refills: 1 | Status: SHIPPED | OUTPATIENT
Start: 2024-07-25 | End: 2024-09-23

## 2024-08-23 ENCOUNTER — E-VISIT (OUTPATIENT)
Dept: PRIMARY CARE | Facility: CLINIC | Age: 44
End: 2024-08-23
Payer: COMMERCIAL

## 2024-09-10 ENCOUNTER — APPOINTMENT (OUTPATIENT)
Dept: OTOLARYNGOLOGY | Facility: CLINIC | Age: 44
End: 2024-09-10
Payer: COMMERCIAL

## 2024-09-10 VITALS — BODY MASS INDEX: 45.99 KG/M2 | HEIGHT: 67 IN | WEIGHT: 293 LBS | TEMPERATURE: 98 F

## 2024-09-10 DIAGNOSIS — J34.2 DEVIATED NASAL SEPTUM: ICD-10-CM

## 2024-09-10 DIAGNOSIS — J34.89 NASAL OBSTRUCTION: ICD-10-CM

## 2024-09-10 DIAGNOSIS — J33.1 POLYPOID SINUS DEGENERATION: ICD-10-CM

## 2024-09-10 DIAGNOSIS — J32.8 OTHER CHRONIC SINUSITIS: ICD-10-CM

## 2024-09-10 DIAGNOSIS — J34.89 NASAL AND SINUS DISCHARGE: Primary | ICD-10-CM

## 2024-09-10 PROCEDURE — 1036F TOBACCO NON-USER: CPT | Performed by: NURSE PRACTITIONER

## 2024-09-10 PROCEDURE — 31231 NASAL ENDOSCOPY DX: CPT | Performed by: NURSE PRACTITIONER

## 2024-09-10 PROCEDURE — 3008F BODY MASS INDEX DOCD: CPT | Performed by: NURSE PRACTITIONER

## 2024-09-10 PROCEDURE — 99213 OFFICE O/P EST LOW 20 MIN: CPT | Performed by: NURSE PRACTITIONER

## 2024-09-10 ASSESSMENT — PATIENT HEALTH QUESTIONNAIRE - PHQ9
2. FEELING DOWN, DEPRESSED OR HOPELESS: SEVERAL DAYS
1. LITTLE INTEREST OR PLEASURE IN DOING THINGS: SEVERAL DAYS
SUM OF ALL RESPONSES TO PHQ9 QUESTIONS 1 AND 2: 2

## 2024-09-10 ASSESSMENT — PAIN SCALES - GENERAL: PAINLEVEL: 4

## 2024-09-10 NOTE — PATIENT INSTRUCTIONS
Today you were evaluated by Misty Valencia CNP.    Please follow-up in 3 months or sooner if needed. If you have any questions or concerns, please contact my office at [phone].     Resume Xhance.    If not improving in 1-2 weeks, please call me for an antibiotic.

## 2024-09-10 NOTE — PROGRESS NOTES
Subjective   Patient ID: Caity Pastrana is a 44 y.o. female who presents for No chief complaint on file..  HPI  9/10/24- Patient presents for follow-up. She notes that she was doing very well up until the last month. She notes about 1 month ago, she started with the bad headaches, ear fullness, posterior drainage. She notes that she was doing well with Xhance and azelastine. She has been without xhance for 1.5 weeks because she ran out. She notes she went to urgent care last week and was placed on zithromax x 5 days. She has not noticed improvement to her symptoms since taking this.     Review of Systems  Review of systems is negative for constitutional, ophthalmological, cardiac, pulmonary, renal, gastrointestinal, musculoskeletal, mental health, endocrine, or neurologic disorders (except as listed in the HPI, PMH, and Problem List).     Objective   Physical Exam  CONSTITUTIONAL: Vital signs reviewed. Patient appears well developed and well nourished.   GENERAL: this is a healthy appearing female who appears stated age. The patient is alert and appropriately verbally conversant without hoarseness. This patient is in no apparent distress.   FACE: The face was inspected and no cutaneous masses or lesions were visualized. There was no erythema or edema noted. Facial movement was symmetric. No skin lesions were detected.   EYES: Extra-ocular muscle function was intact. No nystagmus was observed. Pupils were equal.   NOSE: Examination of the nose revealed the nasal dorsum to be midline. Intranasal exam reveals the septum is deviated right. The inferior turbinates were hypertrophic. No masses, polyps, mucopus, or other lesion on anterior rhinoscopy. See below procedure note as applicable for further exam.  RESPIRATORY: Normal inspiration and expiration and chest wall expansion, no use of accessory muscles to breathe, no stridor.  NEUROLOGICAL: Patient is ambulatory without assist. Mentation is clear. Answering questions  appropriately.     Nasal / sinus endoscopy    Date/Time: 9/10/2024 8:15 AM    Performed by: JAN Mendoza  Authorized by: JAN Mendoza    Consent:     Consent obtained:  Verbal    Consent given by:  Patient    Risks discussed:  Bleeding, infection and pain    Alternatives discussed:  No treatment, observation and delayed treatment  Procedure details:     Indications: assessment of airway and sino-nasal symptoms      Medication:  Afrin and Nick-Synephrine 2%    Instrument: flexible fiberoptic nasal endoscope      Scope location: bilateral nare    Post-procedure details:     Patient tolerance of procedure:  Tolerated well, no immediate complications  Comments:      Findings: After topical decongestion with decongestant and anesthetic spray, nasal endoscopy was performed using an endoscope. The septum was deviated right. The inferior turbinates were hypertrophic and edematous anteriorly. The middle turbinates appeared healthy, the middle meatus is free of purulence, masses, lesions or polyps. There is thicker drainage from the left middle meatus. The superior meatus and sphenoethmoid recess are clear bilaterally. There is clear mucoid drainage down the posterior pharynx.  The nasal passageway is patent. The nasopharynx was clear. There were no complications and the patient tolerated the procedure well.        Assessment/Plan     Caity Pastrana is a 44 y.o. year old female with symptoms and clinical findings consistent with chronic rhinitis, possibly allergic rhinitis and chronic sinusitis. Patient will increase to Xhance and continue azelastine. She does not desire surgical procedure at this time so will defer CT Sinus until follow up if not improving. Prior MRI imaging from 2018 shows chronic pansinusitis.   9/10/24- Was improving with Xhance and azelastine until 1 month ago. No improvement with z-pack. Will resume xhance. If not improving, would try cefdinir.       Plan:  Nasal  endoscopy: Findings: right dev, ITH, edematous tissue throughout, thick drainage from left middle meatus.  I personally reviewed the patients MRI scan images and results. I discussed the results personally with the patient. The following findings were discussed: 11/16/18: MRI Brain: Right nasal septal deviation. Inferior turbinate hypertrophy (left). Maxillary and ethmoid mucosal thickening. Partial visualization of the frontal sinuses appearing with thickening bilaterally, worse on the right.   I discussed the findings the patient and offered reassurance and counseling.  We agreed to proceed with therapeutic measures to address the issues noted above.   1. We will have the patient resume a stronger steroid nasal spray to help improve nasal symptoms.   2. Patient was instructed to continue azelastine nasal spray.   3. We discussed that if she is not improving with the above treatment, would consider addition of antibiotic. Could consider cefdinir. She will call me in 1-2 weeks.   4. Patient will follow-up in 3 months to assess for benefit of therapies and for further management.  All questions were answered and patient agrees with established plan of care.

## 2024-09-13 ENCOUNTER — TELEPHONE (OUTPATIENT)
Dept: OTOLARYNGOLOGY | Facility: CLINIC | Age: 44
End: 2024-09-13
Payer: COMMERCIAL

## 2024-09-13 DIAGNOSIS — J34.89 NASAL AND SINUS DISCHARGE: ICD-10-CM

## 2024-09-13 RX ORDER — CEFDINIR 300 MG/1
300 CAPSULE ORAL 2 TIMES DAILY
Qty: 20 CAPSULE | Refills: 0 | Status: SHIPPED | OUTPATIENT
Start: 2024-09-13 | End: 2024-09-23

## 2024-09-19 ENCOUNTER — E-VISIT (OUTPATIENT)
Dept: PRIMARY CARE | Facility: CLINIC | Age: 44
End: 2024-09-19
Payer: COMMERCIAL

## 2024-09-19 ENCOUNTER — PATIENT MESSAGE (OUTPATIENT)
Dept: PRIMARY CARE | Facility: CLINIC | Age: 44
End: 2024-09-19

## 2024-09-19 DIAGNOSIS — U07.1 COVID-19: ICD-10-CM

## 2024-09-19 DIAGNOSIS — U07.1 COVID-19: Primary | ICD-10-CM

## 2024-09-19 NOTE — TELEPHONE ENCOUNTER
Rx sent. Because I am not the prescribing doctor for Cefdinir, you should ask ENT if it should be continued.

## 2024-09-19 NOTE — TELEPHONE ENCOUNTER
It's likely not the ATBx. Pt has Covid and this is likely what's causing the flu like symptoms. If she's had flu-like symptoms for less than 5 days, then her Covid infection can be treated with Paxlovid.

## 2024-09-19 NOTE — TELEPHONE ENCOUNTER
It is likely a coincidence. It's likely Covid causing your symptoms. Would you like Paxlovid treatment?

## 2024-09-19 NOTE — TELEPHONE ENCOUNTER
Per pharmacy comment - [facility] not [facility] it. [facility] message sent to patient asking where she would like it sent to instead. Awaiting a reply.

## 2024-09-20 RX ORDER — NIRMATRELVIR AND RITONAVIR 300-100 MG
KIT ORAL
Qty: 30 TABLET | Refills: 0 | OUTPATIENT
Start: 2024-09-20

## 2024-09-29 ENCOUNTER — OFFICE VISIT (OUTPATIENT)
Dept: URGENT CARE | Age: 44
End: 2024-09-29
Payer: COMMERCIAL

## 2024-09-29 VITALS
TEMPERATURE: 98.1 F | RESPIRATION RATE: 20 BRPM | WEIGHT: 293 LBS | HEART RATE: 71 BPM | SYSTOLIC BLOOD PRESSURE: 146 MMHG | BODY MASS INDEX: 44.41 KG/M2 | OXYGEN SATURATION: 97 % | HEIGHT: 68 IN | DIASTOLIC BLOOD PRESSURE: 82 MMHG

## 2024-09-29 DIAGNOSIS — R14.1 ABDOMINAL GAS PAIN: Primary | ICD-10-CM

## 2024-09-29 DIAGNOSIS — K21.9 GASTROESOPHAGEAL REFLUX DISEASE, UNSPECIFIED WHETHER ESOPHAGITIS PRESENT: ICD-10-CM

## 2024-09-29 PROCEDURE — 1036F TOBACCO NON-USER: CPT | Performed by: NURSE PRACTITIONER

## 2024-09-29 PROCEDURE — 99213 OFFICE O/P EST LOW 20 MIN: CPT | Performed by: NURSE PRACTITIONER

## 2024-09-29 PROCEDURE — 3008F BODY MASS INDEX DOCD: CPT | Performed by: NURSE PRACTITIONER

## 2024-09-29 ASSESSMENT — ENCOUNTER SYMPTOMS
ABDOMINAL PAIN: 1
DIARRHEA: 0
BLOOD IN STOOL: 0
CONSTIPATION: 0
NAUSEA: 0
ABDOMINAL DISTENTION: 1

## 2024-09-29 ASSESSMENT — PAIN SCALES - GENERAL: PAINLEVEL: 7

## 2024-09-29 NOTE — PROGRESS NOTES
Subjective   Patient ID: Caity Pastrana is a 44 y.o. female. They present today with a chief complaint of Abdominal Pain (Sever Pain following each meal. X 1 w. Patient recently completed an abx tx of Cefdinir. ).    History of Present Illness  Here with abd discomfort and pain after eating  Feels that this happened after taking cefdinir   She denies diarrhea, blood stools, or fevers    Is taking OTC PPI, probiotic, and allergy medication, and tums            Past Medical History  Allergies as of 09/29/2024 - Reviewed 09/29/2024   Allergen Reaction Noted    Amoxicillin-pot clavulanate GI Upset and Headache 05/16/2024    Doxycycline monohydrate Dizziness, GI Upset, and Headache 05/16/2024    Metformin Blurred vision and Headache 02/28/2024    Nickel Itching 06/14/2013    Sulfa (sulfonamide antibiotics) Unknown 02/28/2024    Tramadol Unknown 02/28/2024    Bee pollen Rash 02/28/2024    Levofloxacin Dizziness, GI Upset, Headache, Other, and Palpitations 05/16/2024       (Not in a hospital admission)       Past Medical History:   Diagnosis Date    Allergic rhinitis     CTS (carpal tunnel syndrome)     Depression     Dizziness     GERD (gastroesophageal reflux disease)     Migraine     Personal history of other diseases of the digestive system     History of esophageal reflux    Personal history of other diseases of urinary system     History of bladder problems    Personal history of other mental and behavioral disorders     History of depression    Sleep difficulties     Urinary tract infection        Past Surgical History:   Procedure Laterality Date    BREAST RECONSTRUCTION Bilateral     breast reduction in 2005    CHOLECYSTECTOMY  01/06/2014    Cholecystectomy    COLPOSCOPY      PLANTAR FASCIA SURGERY Right 2013    TUBAL LIGATION  01/06/2014    Tubal Ligation        reports that she has never smoked. She has been exposed to tobacco smoke. She has never used smokeless tobacco. She reports current alcohol use of about  "1.0 standard drink of alcohol per week. She reports that she does not use drugs.    Review of Systems  Review of Systems   Gastrointestinal:  Positive for abdominal distention and abdominal pain. Negative for blood in stool, constipation, diarrhea and nausea.                                  Objective    Vitals:    09/29/24 1233   BP: 146/82   Pulse: 71   Resp: 20   Temp: 36.7 °C (98.1 °F)   TempSrc: Oral   SpO2: 97%   Weight: 145 kg (320 lb)   Height: 1.715 m (5' 7.5\")     No LMP recorded. Patient is postmenopausal.    Physical Exam  Vitals reviewed.   Constitutional:       Appearance: Normal appearance.   Cardiovascular:      Rate and Rhythm: Normal rate and regular rhythm.      Pulses: Normal pulses.      Heart sounds: Normal heart sounds.   Pulmonary:      Effort: Pulmonary effort is normal.      Breath sounds: Normal breath sounds.   Abdominal:      General: Abdomen is protuberant. Bowel sounds are normal.      Palpations: Abdomen is soft.      Tenderness: There is no abdominal tenderness.   Neurological:      Mental Status: She is alert.         Procedures    Point of Care Test & Imaging Results from this visit  No results found for this visit on 09/29/24.   No results found.    Diagnostic study results (if any) were reviewed by Misty Kelly.    Assessment/Plan   Allergies, medications, history, and pertinent labs/EKGs/Imaging reviewed by Misty Kelly.     Medical Decision Making  Discussed limitations with abd exam and testing at   Encouraged continued OTC PPI, allergy medication and probiotics  Please call PCP in the AM  Encouraged beige, bland foods - nothing with roughage and to increase clear fluids    If the abd pain increases and is 10/10 to the ED for further diagnostic testing    Orders and Diagnoses  There are no diagnoses linked to this encounter.    Medical Admin Record      Patient disposition: Home    Electronically signed by Misty Kelly  12:45 PM      "

## 2024-10-01 ENCOUNTER — OFFICE VISIT (OUTPATIENT)
Dept: PRIMARY CARE | Facility: CLINIC | Age: 44
End: 2024-10-01
Payer: COMMERCIAL

## 2024-10-01 VITALS
SYSTOLIC BLOOD PRESSURE: 132 MMHG | BODY MASS INDEX: 48.92 KG/M2 | WEIGHT: 293 LBS | DIASTOLIC BLOOD PRESSURE: 84 MMHG | HEART RATE: 88 BPM | TEMPERATURE: 96 F | OXYGEN SATURATION: 98 %

## 2024-10-01 DIAGNOSIS — F41.9 ANXIETY: ICD-10-CM

## 2024-10-01 DIAGNOSIS — K21.9 GASTROESOPHAGEAL REFLUX DISEASE WITHOUT ESOPHAGITIS: Primary | ICD-10-CM

## 2024-10-01 DIAGNOSIS — R10.9 ABDOMINAL CRAMPING: ICD-10-CM

## 2024-10-01 PROCEDURE — 1036F TOBACCO NON-USER: CPT | Performed by: INTERNAL MEDICINE

## 2024-10-01 PROCEDURE — 99213 OFFICE O/P EST LOW 20 MIN: CPT | Performed by: INTERNAL MEDICINE

## 2024-10-01 RX ORDER — DICYCLOMINE HYDROCHLORIDE 20 MG/1
20 TABLET ORAL 4 TIMES DAILY PRN
Qty: 60 TABLET | Refills: 0 | Status: SHIPPED | OUTPATIENT
Start: 2024-10-01

## 2024-10-01 RX ORDER — OMEPRAZOLE 40 MG/1
40 CAPSULE, DELAYED RELEASE ORAL
Qty: 14 CAPSULE | Refills: 0 | Status: SHIPPED | OUTPATIENT
Start: 2024-10-01

## 2024-10-01 RX ORDER — FAMOTIDINE 10 MG/1
10 TABLET ORAL 2 TIMES DAILY
COMMUNITY

## 2024-10-01 RX ORDER — BUSPIRONE HYDROCHLORIDE 7.5 MG/1
7.5 TABLET ORAL DAILY
Start: 2024-10-01 | End: 2025-10-01

## 2024-10-01 RX ORDER — CALCIUM CARBONATE 200(500)MG
1 TABLET,CHEWABLE ORAL DAILY
COMMUNITY

## 2024-10-01 ASSESSMENT — ENCOUNTER SYMPTOMS
ANOREXIA: 0
BELCHING: 1
ARTHRALGIAS: 1
ABDOMINAL PAIN: 1
FLATUS: 1
MYALGIAS: 1

## 2024-10-01 ASSESSMENT — PAIN SCALES - GENERAL: PAINLEVEL: 4

## 2024-10-01 NOTE — PROGRESS NOTES
Subjective   Patient ID: Caity Pastrana is a 44 y.o. female who presents for Follow-up (Pt here for indigestion and abdominal pain when eating.Believes this may be due to the last ABTX Cefdinir she took. Finished it on Tuesday. Pt states she has been taking a probiotic daily. ).    Symptoms are not relieved with OTC Pepcid. She has limited her diet due to her symptoms.    Abdominal Pain  This is a new problem. The current episode started in the past 7 days. The onset quality is gradual. The problem occurs 2 to 4 times per day. The most recent episode lasted 6 days. The problem has been gradually improving. The pain is located in the LUQ, RUQ and epigastric region. The pain is at a severity of 4/10. The quality of the pain is aching and burning. The abdominal pain does not radiate. Associated symptoms include arthralgias, belching, flatus and myalgias. Pertinent negatives include no anorexia. The pain is aggravated by eating. The pain is relieved by Belching and passing flatus.        Review of Systems   Gastrointestinal:  Positive for abdominal pain and flatus. Negative for anorexia.   Musculoskeletal:  Positive for arthralgias and myalgias.       Objective   /84   Pulse 88   Temp 35.6 °C (96 °F)   Wt 144 kg (317 lb)   SpO2 98%   BMI 48.92 kg/m²     Physical Exam  Vitals reviewed.   Constitutional:       General: She is not in acute distress.     Appearance: She is ill-appearing. She is not toxic-appearing.   Cardiovascular:      Rate and Rhythm: Normal rate and regular rhythm.      Heart sounds: Normal heart sounds.   Pulmonary:      Effort: Pulmonary effort is normal.      Breath sounds: Normal breath sounds.   Abdominal:      General: Bowel sounds are normal.      Palpations: Abdomen is soft.      Tenderness: There is generalized abdominal tenderness. There is no guarding or rebound.   Neurological:      General: No focal deficit present.      Mental Status: She is alert and oriented to person, place,  and time.   Psychiatric:         Mood and Affect: Mood normal.         Assessment/Plan   Diagnoses and all orders for this visit:  Gastroesophageal reflux disease without esophagitis  -     omeprazole (PriLOSEC) 40 mg DR capsule; Take 1 capsule (40 mg) by mouth once daily in the morning. Take before meals. Do not crush or chew.  Anxiety  -     busPIRone (Buspar) 7.5 mg tablet; Take 1 tablet (7.5 mg) by mouth once daily.  Abdominal cramping  -     dicyclomine (Bentyl) 20 mg tablet; Take 1 tablet (20 mg) by mouth 4 times a day as needed (abdominal pain or cramps).

## 2024-10-11 DIAGNOSIS — J32.9 CHRONIC RHINOSINUSITIS: ICD-10-CM

## 2024-10-11 RX ORDER — FLUTICASONE PROPIONATE 50 MCG
1 SPRAY, SUSPENSION (ML) NASAL 2 TIMES DAILY
Qty: 48 G | Refills: 3 | Status: SHIPPED | OUTPATIENT
Start: 2024-10-11 | End: 2025-10-11

## 2024-10-15 ENCOUNTER — OFFICE VISIT (OUTPATIENT)
Dept: OBSTETRICS AND GYNECOLOGY | Facility: HOSPITAL | Age: 44
End: 2024-10-15
Payer: COMMERCIAL

## 2024-10-15 VITALS
WEIGHT: 293 LBS | SYSTOLIC BLOOD PRESSURE: 124 MMHG | BODY MASS INDEX: 44.41 KG/M2 | HEIGHT: 68 IN | DIASTOLIC BLOOD PRESSURE: 76 MMHG

## 2024-10-15 DIAGNOSIS — N93.9 VAGINAL SPOTTING: Primary | ICD-10-CM

## 2024-10-15 PROCEDURE — 87661 TRICHOMONAS VAGINALIS AMPLIF: CPT | Performed by: ADVANCED PRACTICE MIDWIFE

## 2024-10-15 PROCEDURE — 87205 SMEAR GRAM STAIN: CPT | Performed by: ADVANCED PRACTICE MIDWIFE

## 2024-10-15 PROCEDURE — 87086 URINE CULTURE/COLONY COUNT: CPT | Performed by: ADVANCED PRACTICE MIDWIFE

## 2024-10-15 PROCEDURE — 3008F BODY MASS INDEX DOCD: CPT | Performed by: ADVANCED PRACTICE MIDWIFE

## 2024-10-15 PROCEDURE — 99213 OFFICE O/P EST LOW 20 MIN: CPT | Performed by: ADVANCED PRACTICE MIDWIFE

## 2024-10-15 PROCEDURE — 99203 OFFICE O/P NEW LOW 30 MIN: CPT | Performed by: ADVANCED PRACTICE MIDWIFE

## 2024-10-15 PROCEDURE — 87491 CHLMYD TRACH DNA AMP PROBE: CPT | Performed by: ADVANCED PRACTICE MIDWIFE

## 2024-10-15 RX ORDER — ERGOCALCIFEROL 1.25 MG/1
1 CAPSULE ORAL
COMMUNITY
Start: 2024-09-04

## 2024-10-15 SDOH — ECONOMIC STABILITY: FOOD INSECURITY: WITHIN THE PAST 12 MONTHS, THE FOOD YOU BOUGHT JUST DIDN'T LAST AND YOU DIDN'T HAVE MONEY TO GET MORE.: NEVER TRUE

## 2024-10-15 SDOH — ECONOMIC STABILITY: FOOD INSECURITY: WITHIN THE PAST 12 MONTHS, YOU WORRIED THAT YOUR FOOD WOULD RUN OUT BEFORE YOU GOT MONEY TO BUY MORE.: NEVER TRUE

## 2024-10-15 ASSESSMENT — SOCIAL DETERMINANTS OF HEALTH (SDOH)
WITHIN THE LAST YEAR, HAVE YOU BEEN KICKED, HIT, SLAPPED, OR OTHERWISE PHYSICALLY HURT BY YOUR PARTNER OR EX-PARTNER?: NO
WITHIN THE LAST YEAR, HAVE YOU BEEN AFRAID OF YOUR PARTNER OR EX-PARTNER?: NO
WITHIN THE LAST YEAR, HAVE TO BEEN RAPED OR FORCED TO HAVE ANY KIND OF SEXUAL ACTIVITY BY YOUR PARTNER OR EX-PARTNER?: NO
WITHIN THE LAST YEAR, HAVE YOU BEEN HUMILIATED OR EMOTIONALLY ABUSED IN OTHER WAYS BY YOUR PARTNER OR EX-PARTNER?: NO

## 2024-10-15 ASSESSMENT — ENCOUNTER SYMPTOMS: BACK PAIN: 1

## 2024-10-15 ASSESSMENT — PATIENT HEALTH QUESTIONNAIRE - PHQ9
SUM OF ALL RESPONSES TO PHQ9 QUESTIONS 1 & 2: 0
2. FEELING DOWN, DEPRESSED OR HOPELESS: NOT AT ALL
1. LITTLE INTEREST OR PLEASURE IN DOING THINGS: NOT AT ALL

## 2024-10-15 ASSESSMENT — PAIN SCALES - GENERAL: PAINLEVEL: 5

## 2024-10-15 NOTE — PROGRESS NOTES
"Assessment/Plan   Diagnoses and all orders for this visit:  Vaginal spotting  -     unclear if truly menopausal or not? Pt notes the last \"full period\" was ~4 years ago but has always had spotting since  -     FSH was at post-menopausal level ,  but NOT in ,  or earlier this year  -     pap NIL HPV neg 2024  -     had hysteroscopy with polypectomy 3/2024  -     Urine culture  -     Vaginitis Gram Stain For Bacterial Vaginosis + Yeast  -     C. trachomatis / N. gonorrhoeae, Amplified  -     Trichomonas vaginalis, Amplified  -     follow up with gynecologist for EMB if deemed necessary    Becca Torrez, APRN-CNM, APRN-CNP    Subjective   Caity Pastrana is a 44 y.o. female who presents for vaginal discharge and vaginal spotting.    Reports vaginal spotting ~2 weeks ago, then had vaginal irritation and yeast infection. Noted Clumpy white discharge. Used OTC monistat with resolution of symptoms. The bleeding was only with wiping. Now resolved    Current contraception: tubal ligation    Menstrual History:  OB History          2    Para   2    Term   2       0    AB   0    Living   2         SAB   0    IAB   0    Ectopic   0    Multiple   0    Live Births   2                No LMP recorded. Patient is postmenopausal.     Objective   /76   Ht 1.715 m (5' 7.5\")   Wt 144 kg (317 lb)   BMI 48.92 kg/m²   Physical Exam  Constitutional:       Appearance: Normal appearance.   Genitourinary:      Vulva and urethral meatus normal.      No lesions in the vagina.      Right Labia: No rash, tenderness, lesions or skin changes.     Left Labia: No tenderness, lesions, skin changes or rash.     No vaginal discharge, erythema, tenderness, bleeding, ulceration or granulation tissue.      Mild vaginal atrophy present.     Cervix is parous.      Cervical friability present.      No cervical discharge or lesion.      Uterus is anteverted.      Pelvic exam was performed with patient in the lithotomy " position.   Pulmonary:      Effort: Pulmonary effort is normal.   Neurological:      Mental Status: She is alert.   Psychiatric:         Mood and Affect: Mood normal.         Behavior: Behavior normal. Behavior is cooperative.   Vitals reviewed.

## 2024-10-16 LAB
C TRACH RRNA SPEC QL NAA+PROBE: NEGATIVE
CLUE CELLS VAG LPF-#/AREA: NORMAL /[LPF]
N GONORRHOEA DNA SPEC QL PROBE+SIG AMP: NEGATIVE
NUGENT SCORE: 0
T VAGINALIS RRNA SPEC QL NAA+PROBE: NEGATIVE
YEAST VAG WET PREP-#/AREA: NORMAL

## 2024-10-17 LAB — BACTERIA UR CULT: NORMAL

## 2024-11-21 ENCOUNTER — APPOINTMENT (OUTPATIENT)
Dept: ALLERGY | Facility: CLINIC | Age: 44
End: 2024-11-21
Payer: COMMERCIAL

## 2024-11-21 VITALS
SYSTOLIC BLOOD PRESSURE: 136 MMHG | WEIGHT: 293 LBS | HEIGHT: 68 IN | TEMPERATURE: 98.7 F | HEART RATE: 78 BPM | DIASTOLIC BLOOD PRESSURE: 88 MMHG | BODY MASS INDEX: 44.41 KG/M2 | OXYGEN SATURATION: 95 %

## 2024-11-21 DIAGNOSIS — H10.13 ALLERGIC CONJUNCTIVITIS OF BOTH EYES: ICD-10-CM

## 2024-11-21 DIAGNOSIS — J30.81 ALLERGIC RHINITIS DUE TO ANIMAL HAIR AND DANDER: ICD-10-CM

## 2024-11-21 DIAGNOSIS — J30.1 SEASONAL ALLERGIC RHINITIS DUE TO POLLEN: Primary | ICD-10-CM

## 2024-11-21 PROCEDURE — 99204 OFFICE O/P NEW MOD 45 MIN: CPT | Performed by: ALLERGY & IMMUNOLOGY

## 2024-11-21 PROCEDURE — 95004 PERQ TESTS W/ALRGNC XTRCS: CPT | Performed by: ALLERGY & IMMUNOLOGY

## 2024-11-21 RX ORDER — AZELASTINE 1 MG/ML
2 SPRAY, METERED NASAL 2 TIMES DAILY PRN
Qty: 30 ML | Refills: 11 | Status: SHIPPED | OUTPATIENT
Start: 2024-11-21

## 2024-11-21 RX ORDER — TRIAMCINOLONE ACETONIDE 55 UG/1
2 SPRAY, METERED NASAL DAILY
Qty: 16.5 G | Refills: 11 | Status: SHIPPED | OUTPATIENT
Start: 2024-11-21 | End: 2025-11-21

## 2024-11-21 RX ORDER — CETIRIZINE HYDROCHLORIDE 10 MG/1
10 TABLET ORAL DAILY PRN
Qty: 30 TABLET | Refills: 11 | Status: SHIPPED | OUTPATIENT
Start: 2024-11-21 | End: 2025-11-21

## 2024-11-21 RX ORDER — OLOPATADINE HYDROCHLORIDE 2 MG/ML
1 SOLUTION/ DROPS OPHTHALMIC DAILY PRN
Qty: 2.5 ML | Refills: 5 | Status: SHIPPED | OUTPATIENT
Start: 2024-11-21 | End: 2025-11-21

## 2024-11-21 ASSESSMENT — PAIN SCALES - GENERAL: PAINLEVEL_OUTOF10: 0-NO PAIN

## 2024-11-21 NOTE — PROGRESS NOTES
"Caity Pastrana presents for initial evaluation today.      Caity Pastrana was seen at the request of Misty Valencia A*for a chief complaint of concern for allergies; a report with my findings is being sent via written or electronic means to Misty Valencia A*with my recommendations for treatment    She provides the following history:    She reports that she has been experiencing sinus congestion, sinus pressure, postnasal drainage, headache, itchy eyes, sneezing, occurring year-round.  She is currently following with Misty Valencia in ENT.  She has tried Xhance samples, however it was not covered by her insurance.  She has been prescribed Flonase and Astelin but is not currently on them.  She has tried Claritin as needed.      Asthma: Denies     Eczema: Denies     Food allergy: Denies     Venom allergy:  Denies     Drug allergy: Augmentin and Bactrim- GI upset, headache; Levofloxacin- itchy throat;    Environmental History:  Type of home:  House  Pets in the house: Dog   Mold or moisture in the home: Mold (basement)  Bedroom tiff: Hardwood  Dust mite covers on bed:  No  Cigarette exposure in the home:  No  Occupation/School: Works [employer]     Pertinent Allergy/Immunology family history:  Mom with environmental allergies   Son asthma, shellfish, allergic rhinitis  Daughter with allergic rhinitis     Review of Systems   Constitutional: Negative.    HENT:  Positive for congestion.    Eyes: Negative.    Respiratory: Negative.     Cardiovascular: Negative.    Gastrointestinal: Negative.    Musculoskeletal: Negative.    Skin: Negative.    Allergic/Immunologic: Negative.    Neurological:  Positive for headaches.   Hematological: Negative.      Vital signs:  /88   Pulse 78   Temp 37.1 °C (98.7 °F)   Ht 1.715 m (5' 7.5\")   Wt 147 kg (324 lb)   SpO2 95%   BMI 50.00 kg/m²     Physical Exam:  GENERAL: Alert, oriented and in no acute distress.     HEENT: EYES: No conjunctival " injection or cobblestoning. Nose: nasal turbinates mildly edematous bilaterally and are not boggy.  There is no mucous stranding, polyps, or blood noted. EARS: Tympanic membranes are clear. MOUTH: moist and pink with no exudates, ulcers, or thrush. NECK: No upper airway stridor noted.       HEART: regular rate and rhythm.       LUNGS: Clear to auscultation bilaterally. No wheezing, rhonchi or rales.        ABDOMEN: Positive bowel sounds, soft, nontender, nondistended.       EXTREMITIES: No clubbing or edema.        NEURO:  Normal affect.  Gait normal.      SKIN: No rash, hives, or angioedema noted    Environmental Allergy Testing:  Test Results (wheal/flare in mm)    Controls  Controls  Histamine: 10/35  Negative: 0    Trees:  Trees  American Beech: 0  [place]: 0  Birch: 0  [place]: 0  [place]: 0  [place]: 0  [place]: 0  Elm: 0  [place]: 0  Maple: 0  [place]: 0  [place]: 0  [place]: 0  White poplar: 0     Grasses:  Grass  Bahia:   Bermuda: 5/10  Mark:   KORT Grass Mix:  with satelite  Sweet Vernal: 15/25    Weeds:  Weeds  Cocklebur: 0  Dandelion: 010  English Plantain: 0  Goldenrod: 010  Lambs Quarters: 0/15  Mugwort: 10  Pigweed: 0  Ragweed Mix: 0  Russian Thistle: 0  Yellow Dock:      Molds:  Molds  Alternaria: 0  Aspergillus: 5/15  Cladosporium: 0/10  Helminthosporium: 0  Penicillium: 0  Stemphyllium: 0    Animals/Dust Mite:  Animals  Cat:   AP do/30  Guillen Do  Mouse:   Cockroach: 0  Dust Mite F: 0  Dust Mite P: 0       Impression:  1. Seasonal allergic rhinitis due to pollen    2. Allergic rhinitis due to animal hair and dander    3. Allergic conjunctivitis of both eyes        Assessment and Plan:  Allergic rhinitis, seasonal and perennial: Caity was found to have significant sensitivity to rass and weed pollen, cat, dog, mouse, mold (Aspergillus) on aeroallergen skin prick testing today.  We discussed treatments for allergic rhinitis to include avoidance,  medication, and allergen immunotherapy.   Recommend starting Nasacort 2 sprays each nostril once daily, azelastine 2 sprays each nostril once to twice daily, and may use cetirizine 10 mg once daily as needed.  We reviewed proper nasal spray administration technique. We also discussed starting nasal saline sinus rinse, and have provided with information on this.  If she does not achieve adequate control with medication and/or would like to decrease overall medication use, allergen immunotherapy would be an option to consider in the future.    Allergic conjunctivitis, bilateral:  Prescribed olopatadine 0.2% eyedrops 1 drop each eye daily as needed    Plan for follow-up in 3 months or sooner if needed

## 2024-11-21 NOTE — LETTER
November 22, 2024     MILTON Mendoza-CNP  [address] [facility] [address]    Patient: Caity Pastrana   YOB: 1980   Date of Visit: 11/21/2024       Dear MILTON Calabrese-CNP:    Thank you for referring Caity Pastrana to me for evaluation. Below are my notes for this consultation.  If you have questions, please do not hesitate to call me. I look forward to following your patient along with you.       Sincerely,     Princess QUYEN Patricio MD      CC: No Recipients  ______________________________________________________________________________________    Caity Pastrana presents for initial evaluation today.      Caity Pastrana was seen at the request of Misty Valencia A*for a chief complaint of concern for allergies; a report with my findings is being sent via written or electronic means to Misty Valencia A*with my recommendations for treatment    She provides the following history:    She reports that she has been experiencing sinus congestion, sinus pressure, postnasal drainage, headache, itchy eyes, sneezing, occurring year-round.  She is currently following with Misty Valencia in ENT.  She has tried Xhance samples, however it was not covered by her insurance.  She has been prescribed Flonase and Astelin but is not currently on them.  She has tried Claritin as needed.      Asthma: Denies     Eczema: Denies     Food allergy: Denies     Venom allergy:  Denies     Drug allergy: Augmentin and Bactrim- GI upset, headache; Levofloxacin- itchy throat;    Environmental History:  Type of home:  House  Pets in the house: Dog   Mold or moisture in the home: Mold (basement)  Bedroom tiff: Hardwood  Dust mite covers on bed:  No  Cigarette exposure in the home:  No  Occupation/School: Works IT, [employer]     Pertinent Allergy/Immunology family history:  Mom with environmental allergies   Son asthma, shellfish, allergic rhinitis  Daughter with allergic rhinitis  "    Review of Systems   Constitutional: Negative.    HENT:  Positive for congestion.    Eyes: Negative.    Respiratory: Negative.     Cardiovascular: Negative.    Gastrointestinal: Negative.    Musculoskeletal: Negative.    Skin: Negative.    Allergic/Immunologic: Negative.    Neurological:  Positive for headaches.   Hematological: Negative.      Vital signs:  /88   Pulse 78   Temp 37.1 °C (98.7 °F)   Ht 1.715 m (5' 7.5\")   Wt 147 kg (324 lb)   SpO2 95%   BMI 50.00 kg/m²     Physical Exam:  GENERAL: Alert, oriented and in no acute distress.     HEENT: EYES: No conjunctival injection or cobblestoning. Nose: nasal turbinates mildly edematous bilaterally and are not boggy.  There is no mucous stranding, polyps, or blood noted. EARS: Tympanic membranes are clear. MOUTH: moist and pink with no exudates, ulcers, or thrush. NECK: No upper airway stridor noted.       HEART: regular rate and rhythm.       LUNGS: Clear to auscultation bilaterally. No wheezing, rhonchi or rales.        ABDOMEN: Positive bowel sounds, soft, nontender, nondistended.       EXTREMITIES: No clubbing or edema.        NEURO:  Normal affect.  Gait normal.      SKIN: No rash, hives, or angioedema noted    Environmental Allergy Testing:  Test Results (wheal/flare in mm)    Controls  Controls  Histamine: 10/35  Negative: 0    Trees:  Trees  American Beech: 0  [place]: 0  Birch: 0  Black [place]: 0  [place]: 0  [place]: 0  [place]: 0  Elm: 0  [place]: 0  Maple: 0  [place]: 0  [place]: 0  [place]: 0  White poplar: 0     Grasses:  Grass  Bahia: 5/20  [place]: 5/10  Mark: 5/20  KORT Grass Mix: 20/35 with satelite  Sweet Vernal: 15/25    Weeds:  Weeds  Cocklebur: 0  Dandelion: 0/10  English Plantain: 0/20  Goldenrod: 0/10  Lambs Quarters: 0/15  Mugwort: 5/10  Pigweed: 0  Ragweed Mix: 0  Russian Thistle: 0  Yellow Dock: 7/20     Molds:  Molds  Alternaria: 0  Aspergillus: 5/15  Cladosporium: 0/10  Helminthosporium: 0  Penicillium: " 0  Stemphyllium: 0    Animals/Dust Mite:  Animals  Cat:   AP do/30  Guillen Do  Mouse:   Cockroach: 0  Dust Mite F: 0  Dust Mite P: 0       Impression:  1. Seasonal allergic rhinitis due to pollen    2. Allergic rhinitis due to animal hair and dander    3. Allergic conjunctivitis of both eyes        Assessment and Plan:  Allergic rhinitis, seasonal and perennial: Caity was found to have significant sensitivity to rass and weed pollen, cat, dog, mouse, mold (Aspergillus) on aeroallergen skin prick testing today.  We discussed treatments for allergic rhinitis to include avoidance, medication, and allergen immunotherapy.   Recommend starting Nasacort 2 sprays each nostril once daily, azelastine 2 sprays each nostril once to twice daily, and may use cetirizine 10 mg once daily as needed.  We reviewed proper nasal spray administration technique. We also discussed starting nasal saline sinus rinse, and have provided with information on this.  If she does not achieve adequate control with medication and/or would like to decrease overall medication use, allergen immunotherapy would be an option to consider in the future.    Allergic conjunctivitis, bilateral:  Prescribed olopatadine 0.2% eyedrops 1 drop each eye daily as needed    Plan for follow-up in 3 months or sooner if needed

## 2024-11-21 NOTE — PATIENT INSTRUCTIONS
It was nice to meet you today     Your environmental allergy testing today was positive to grass and weed pollen, cat, dog, mouse, mold.  Please see below for environmental allergen avoidance recommendations.     You may use Nasacort (triamcinolone) 2 sprays each nostril once daily in the morning    Start Astelin (Azelastine) 2 sprays each nostril in the evening.  You may use this twice daily if helpful.  This medication tastes bitter and can make you sleepy    Technique for nasal spray administration:  First, look slightly down, breathe normally, you do not need to sniff while you spray.  To use the nose spray, place the tip in your nose with the opposite hand (left hand, right side of nose, right hand, left side of nose), and aim or point toward the outside of the nose.  Do not sniff or snort medication afterwards as this can cause most of the medication to be swallowed.  Rather, dab your nose with a tissue if any runs out.    You may use Cetirizine (Zyrtec) 10 mg once daily as needed     You may use olopatadine 0.2% eyedrops 1 drop each eye daily as needed    Try nasal saline sinus rinses- recommend Shantanu Med nasal saline rinses.  Must use distilled water for this.    We would like to see you in follow up in 3 months or sooner if needed    Environmental Allergy Testing:  Test Results (wheal/flare in mm)    Controls  Controls  Histamine: 10/35  Negative: 0    Trees:  Trees  American Beech: 0  [place]: 0  Birch: 0  Black [place]: 0  [place]: 0  [place]: 0  [place]: 0  Elm: 0  [place]: 0  Maple: 0  [place]: 0  [place]: 0  [place]: 0  White poplar: 0     Grasses:  Grass  Bahia: 5/20  [place]: 5/10  Mark: 5/20  KORT Grass Mix: 20/35 with satelite  Sweet Vernal: 15/25    Weeds:  Weeds  Cocklebur: 0  Dandelion: 0/10  English Plantain: 0/20  Goldenrod: 0/10  Lambs Quarters: 0/15  Mugwort: 5/10  Pigweed: 0  Ragweed Mix: 0  Russian Thistle: 0  Yellow Dock: 7/20     Molds:  Molds  Alternaria: 0  Aspergillus:  5/15  Cladosporium: 0/10  Helminthosporium: 0  Penicillium: 0  Stemphyllium: 0    Animals/Dust Mite:  Animals  Cat:   AP do/30  Guillen Do  Mouse:   Cockroach: 0  Dust Mite F: 0  Dust Mite P: 0       Thank you for your visit to the [facility].  Part of a successful visit is taking home the information you learned today and using it to make things better.  These take home instructions are to help you, if you have questions or concerns, please contact us.     Please see below for recommendations on environmental allergy control or modification:     Pollen Avoidance--If you are sensitive to pollen, there are a few tips to help limit, but not avoid, exposure.     Minimize outdoor activity between 5am-10am, pollen levels are highest at this time.       Keep car windows closed when traveling.     Close house windows at night, use the air conditioning if necessary.     Check the pollen count to know what pollen is outside (http://aaaai.org/nab).       , Pet Avoidance     Keep pets out of the bedroom at all times.     Keep pets off of furniture and other surfaces like counter tops.     More frequent bathing can help      Groom your pet outside so hair and dander stay outside the home when brushed.     Consider using HEPA air purifier in bedroom    , and      Mold Avoidance.  Mold grows in damp or humid places.  The best way to avoid          environmental molds is to avoid places they grow such as compost piles, decaying          leaf piles and excavation sites.  If you know of any mold in your home, a dilute          bleach solution (1 cup bleach to one gallon of water) can help clean up the mold.            This should be done by a person who is not sensitive.  If there is a water leak, you          should have this fixed to prevent more moisture problems.            Limit Cigarette smoke exposure.  Smoking and second hand smoke can  irritate the          nose and sinuses.  You and the people around you will benefit from avoiding          cigarette smoke.

## 2024-11-22 ASSESSMENT — ENCOUNTER SYMPTOMS
MUSCULOSKELETAL NEGATIVE: 1
HEADACHES: 1
CARDIOVASCULAR NEGATIVE: 1
ALLERGIC/IMMUNOLOGIC NEGATIVE: 1
CONSTITUTIONAL NEGATIVE: 1
EYES NEGATIVE: 1
GASTROINTESTINAL NEGATIVE: 1
RESPIRATORY NEGATIVE: 1
HEMATOLOGIC/LYMPHATIC NEGATIVE: 1

## 2024-12-10 ENCOUNTER — APPOINTMENT (OUTPATIENT)
Dept: OTOLARYNGOLOGY | Facility: CLINIC | Age: 44
End: 2024-12-10
Payer: COMMERCIAL

## 2024-12-26 ENCOUNTER — OFFICE VISIT (OUTPATIENT)
Dept: PRIMARY CARE | Facility: CLINIC | Age: 44
End: 2024-12-26
Payer: COMMERCIAL

## 2024-12-26 ENCOUNTER — HOSPITAL ENCOUNTER (OUTPATIENT)
Dept: RADIOLOGY | Facility: HOSPITAL | Age: 44
Discharge: HOME | End: 2024-12-26
Payer: COMMERCIAL

## 2024-12-26 VITALS
OXYGEN SATURATION: 97 % | RESPIRATION RATE: 18 BRPM | SYSTOLIC BLOOD PRESSURE: 124 MMHG | TEMPERATURE: 98 F | WEIGHT: 293 LBS | DIASTOLIC BLOOD PRESSURE: 80 MMHG | BODY MASS INDEX: 44.41 KG/M2 | HEIGHT: 68 IN | HEART RATE: 84 BPM

## 2024-12-26 DIAGNOSIS — M54.50 ACUTE LOW BACK PAIN, UNSPECIFIED BACK PAIN LATERALITY, UNSPECIFIED WHETHER SCIATICA PRESENT: Primary | ICD-10-CM

## 2024-12-26 DIAGNOSIS — M79.671 RIGHT FOOT PAIN: ICD-10-CM

## 2024-12-26 DIAGNOSIS — M25.552 LEFT HIP PAIN: ICD-10-CM

## 2024-12-26 DIAGNOSIS — M54.50 ACUTE LOW BACK PAIN, UNSPECIFIED BACK PAIN LATERALITY, UNSPECIFIED WHETHER SCIATICA PRESENT: ICD-10-CM

## 2024-12-26 PROCEDURE — 99214 OFFICE O/P EST MOD 30 MIN: CPT | Performed by: NURSE PRACTITIONER

## 2024-12-26 PROCEDURE — 1036F TOBACCO NON-USER: CPT | Performed by: NURSE PRACTITIONER

## 2024-12-26 PROCEDURE — 72110 X-RAY EXAM L-2 SPINE 4/>VWS: CPT

## 2024-12-26 PROCEDURE — 3008F BODY MASS INDEX DOCD: CPT | Performed by: NURSE PRACTITIONER

## 2024-12-26 RX ORDER — PREDNISONE 20 MG/1
40 TABLET ORAL DAILY
Qty: 10 TABLET | Refills: 0 | Status: SHIPPED | OUTPATIENT
Start: 2024-12-26 | End: 2024-12-31

## 2024-12-26 ASSESSMENT — PATIENT HEALTH QUESTIONNAIRE - PHQ9
2. FEELING DOWN, DEPRESSED OR HOPELESS: NOT AT ALL
SUM OF ALL RESPONSES TO PHQ9 QUESTIONS 1 AND 2: 0
1. LITTLE INTEREST OR PLEASURE IN DOING THINGS: NOT AT ALL

## 2024-12-26 ASSESSMENT — ENCOUNTER SYMPTOMS
ARTHRALGIAS: 1
HIP PAIN: 1
GASTROINTESTINAL NEGATIVE: 1
RESPIRATORY NEGATIVE: 1
BACK PAIN: 1
CONSTITUTIONAL NEGATIVE: 1
CARDIOVASCULAR NEGATIVE: 1

## 2024-12-26 ASSESSMENT — PAIN SCALES - GENERAL: PAINLEVEL_OUTOF10: 7

## 2024-12-26 NOTE — PROGRESS NOTES
"Chief Complaint  Caity Pastrana is a 44 y.o. female presenting for \"Hip Pain (Dr Ramírez pt- left hip and low back pain x 1 month- can not think of anything that caused it. Has been taking 800 mg of motrin, and dual action tylenol and advil) and Foot Pain (Right foot pain for a month).\"    Hip Pain          Caity Pastrana is a 44 y.o. female presenting for in her left hip and low back for a month cannot think of anything that caused it she has been taking 800 mg of Motrin and dual action Tylenol and Advil and meloxicam patient advised not to do this as she was going to end up with an ulcer      Past Medical History  Patient Active Problem List    Diagnosis Date Noted    Abnormal uterine bleeding 05/16/2024    Acute constipation 05/16/2024    Amenorrhea 05/16/2024    Ankle arthritis 05/16/2024    Anxiety 05/16/2024    Arthritis of knee 05/16/2024    Bilateral calf pain 05/16/2024    Carpal tunnel syndrome of right wrist 05/16/2024    Allergic rhinitis 05/16/2024    Chronic maxillary sinusitis 05/16/2024    Chronic rhinitis 05/16/2024    Chronic sinusitis 05/16/2024    Chronic pain 05/16/2024    Breast disorder 05/16/2024    Finding of above normal blood pressure 05/16/2024    JOLANTA (generalized anxiety disorder) 05/16/2024    Gastroesophageal reflux disease 05/16/2024    Headache, migraine 05/16/2024    Hypertrophy of both inferior nasal turbinates 05/16/2024    Insomnia 05/16/2024    Low back pain, unspecified 05/16/2024    Metrorrhagia 05/16/2024    Overactive bladder 05/16/2024    Pain in both knees 05/16/2024    Postmenopausal bleeding 05/16/2024    Seasonal allergies 05/16/2024    Skin sensation disturbance 05/16/2024    Vitamin D deficiency 05/16/2024    Menopausal and perimenopausal disorder 03/05/2024    Family history of ovarian cancer 03/03/2024    Family history of breast cancer 03/03/2024    Premature menopause 02/28/2024    BONNIE (obstructive sleep apnea) 09/28/2023    Prediabetes 09/28/2023    " Hyperlipidemia 01/10/2023    Abnormal weight gain 11/20/2015        Medications  Current Outpatient Medications   Medication Instructions    azelastine (Astelin) 137 mcg (0.1 %) nasal spray 1 spray, Each Nostril, 2 times daily PRN, Use in each nostril as directed    azelastine (Astelin) 137 mcg (0.1 %) nasal spray 2 sprays, Each Nostril, 2 times daily PRN, Use in each nostril as directed    busPIRone (BUSPAR) 7.5 mg, oral, Daily    cetirizine (ZYRTEC) 10 mg, oral, Daily PRN    cyclobenzaprine (FLEXERIL) 10 mg, oral, Nightly PRN    dicyclomine (BENTYL) 20 mg, oral, 4 times daily PRN    ergocalciferol (Vitamin D-2) 1.25 MG (90561 UT) capsule 1 capsule, Every 7 days    fluticasone (Flonase) 50 mcg/actuation nasal spray 1 spray, Each Nostril, 2 times daily, Shake gently. Before first use, prime pump. After use, clean tip and replace cap.    olopatadine (Pataday) 0.2 % ophthalmic solution 1 drop, ophthalmic (eye), Daily PRN    omeprazole (PRILOSEC) 40 mg, oral, Daily before breakfast, Do not crush or chew.    predniSONE (DELTASONE) 40 mg, oral, Daily    sod chloride-sodium bicarb (Neilmed Sinus Rinse Complete) nasal rinse Irrigate your nose per instructions twice daily    triamcinolone (Nasacort) 55 mcg nasal inhaler 2 sprays, Each Nostril, Daily    zolpidem (AMBIEN) 10 mg, oral, Nightly PRN        Surgical History  She has a past surgical history that includes Cholecystectomy (01/06/2014); Tubal ligation (01/06/2014); Colposcopy; Plantar fascia surgery (Right, 2013); and Breast reconstruction (Bilateral).     Social History  She reports that she has never smoked. She has been exposed to tobacco smoke. She has never used smokeless tobacco. She reports current alcohol use of about 1.0 standard drink of alcohol per week. She reports that she does not use drugs.    Family History  Family History   Problem Relation Name Age of Onset    Arthritis Mother Nazia Pastrana     Breast cancer Mother Nazia Pastrana     Other (Ovarian  cancer) Mother Nazia Pastrana     Diabetes Mother Nazia Pastrana     Mental illness Mother Nazia Pastrana     Ovarian cancer Mother Nazia Pastrana     Cancer Mother Nazia Pastrana         Allergies  Amoxicillin-pot clavulanate, Doxycycline monohydrate, Metformin, Nickel, Oxycodone-acetaminophen, Sulfa (sulfonamide antibiotics), Tramadol, Bee pollen, Levofloxacin, and Naproxen    ROS  Review of Systems   Constitutional: Negative.    Respiratory: Negative.     Cardiovascular: Negative.    Gastrointestinal: Negative.    Genitourinary: Negative.    Musculoskeletal:  Positive for arthralgias and back pain.        Last Recorded Vitals  /80 (BP Location: Right arm, Patient Position: Sitting, BP Cuff Size: Adult)   Pulse 84   Temp 36.7 °C (98 °F)   Resp 18   Wt 144 kg (318 lb)   SpO2 97%     Physical Exam  Vitals and nursing note reviewed.   Constitutional:       Appearance: Normal appearance.   Cardiovascular:      Rate and Rhythm: Normal rate and regular rhythm.      Pulses: Normal pulses.      Heart sounds: Normal heart sounds.   Pulmonary:      Effort: Pulmonary effort is normal.      Breath sounds: Normal breath sounds.   Musculoskeletal:         General: Tenderness (Low spine left hip) present.   Neurological:      Mental Status: She is alert.         Relevant Results      Assessment/Plan   Caity was seen today for hip pain and foot pain.  Diagnoses and all orders for this visit:  Acute low back pain, unspecified back pain laterality, unspecified whether sciatica present (Primary)  -     predniSONE (Deltasone) 20 mg tablet; Take 2 tablets (40 mg) by mouth once daily for 5 days.  -     XR lumbar spine 2-3 views; Future  Left hip pain  -     predniSONE (Deltasone) 20 mg tablet; Take 2 tablets (40 mg) by mouth once daily for 5 days.  Right foot pain  -     predniSONE (Deltasone) 20 mg tablet; Take 2 tablets (40 mg) by mouth once daily for 5 days.  -     Referral to Podiatry; Future          COUNSELING      Medication  education:              Education:  The patient is counseled regarding potential side-effects of any and all new medications             Understanding:  Patient expressed understanding             Adherence:  No barriers to adherence identified        Josi Luque, APRN-CNP

## 2025-01-08 ENCOUNTER — OFFICE VISIT (OUTPATIENT)
Dept: PRIMARY CARE | Facility: CLINIC | Age: 45
End: 2025-01-08
Payer: COMMERCIAL

## 2025-01-08 VITALS — WEIGHT: 293 LBS | TEMPERATURE: 96 F | OXYGEN SATURATION: 96 % | BODY MASS INDEX: 49.26 KG/M2 | HEART RATE: 85 BPM

## 2025-01-08 DIAGNOSIS — K21.9 GASTROESOPHAGEAL REFLUX DISEASE WITHOUT ESOPHAGITIS: ICD-10-CM

## 2025-01-08 DIAGNOSIS — G89.29 CHRONIC LEFT-SIDED LOW BACK PAIN WITHOUT SCIATICA: Primary | ICD-10-CM

## 2025-01-08 DIAGNOSIS — M54.50 CHRONIC LEFT-SIDED LOW BACK PAIN WITHOUT SCIATICA: Primary | ICD-10-CM

## 2025-01-08 PROCEDURE — 1036F TOBACCO NON-USER: CPT | Performed by: INTERNAL MEDICINE

## 2025-01-08 PROCEDURE — 99213 OFFICE O/P EST LOW 20 MIN: CPT | Performed by: INTERNAL MEDICINE

## 2025-01-08 RX ORDER — MELOXICAM 15 MG/1
15 TABLET ORAL DAILY
Qty: 30 TABLET | Refills: 3 | Status: SHIPPED | OUTPATIENT
Start: 2025-01-08 | End: 2026-01-08

## 2025-01-08 RX ORDER — OMEPRAZOLE 40 MG/1
40 CAPSULE, DELAYED RELEASE ORAL
Qty: 30 CAPSULE | Refills: 3 | Status: SHIPPED | OUTPATIENT
Start: 2025-01-08

## 2025-01-08 ASSESSMENT — COLUMBIA-SUICIDE SEVERITY RATING SCALE - C-SSRS
6. HAVE YOU EVER DONE ANYTHING, STARTED TO DO ANYTHING, OR PREPARED TO DO ANYTHING TO END YOUR LIFE?: NO
1. IN THE PAST MONTH, HAVE YOU WISHED YOU WERE DEAD OR WISHED YOU COULD GO TO SLEEP AND NOT WAKE UP?: NO
2. HAVE YOU ACTUALLY HAD ANY THOUGHTS OF KILLING YOURSELF?: NO

## 2025-01-08 ASSESSMENT — ENCOUNTER SYMPTOMS
CHILLS: 0
HEADACHES: 0
DIZZINESS: 0
BACK PAIN: 1
FEVER: 0
UNEXPECTED WEIGHT CHANGE: 0
ROS GI COMMENTS: SEE HPI
SHORTNESS OF BREATH: 0

## 2025-01-08 ASSESSMENT — PAIN SCALES - GENERAL: PAINLEVEL_OUTOF10: 4

## 2025-01-08 NOTE — PROGRESS NOTES
Subjective   Patient ID: Caity Pastrana is a 44 y.o. female who presents for Follow-up (Taste bud change and anti inflammatories).    This is a 44 y.o. presenting with continued chronic LBP. She states that she was taking Ibuprofen daily for months and was advised to stop it due to potential adverse effects on her stomach and kidneys. She has been taking Tylenol and muscle relaxers with minimal relief. She is asking if she can restart an anti-inflammatory.  Pt is requesting Omeprazole RF for GERD.  She also has noticed that she has a change in taste since Covid. She can taste sweets, but not salt. She has started to over-season her food due to not being able to taste the salt.         Review of Systems   Constitutional:  Negative for chills, fever and unexpected weight change.   HENT:          See HPI   Respiratory:  Negative for shortness of breath.    Cardiovascular:  Negative for chest pain.   Gastrointestinal:         See HPI   Musculoskeletal:  Positive for back pain.   Neurological:  Negative for dizziness and headaches.       Objective   Pulse 85   Temp 35.6 °C (96 °F)   Wt 145 kg (319 lb 3.2 oz)   SpO2 96%   BMI 49.26 kg/m²     Physical Exam  Vitals reviewed.   Constitutional:       General: She is not in acute distress.     Appearance: Normal appearance.   Cardiovascular:      Rate and Rhythm: Normal rate and regular rhythm.      Heart sounds: Normal heart sounds.   Pulmonary:      Effort: Pulmonary effort is normal.      Breath sounds: Normal breath sounds.   Musculoskeletal:      Lumbar back: Tenderness present.   Neurological:      General: No focal deficit present.      Mental Status: She is alert and oriented to person, place, and time.         Assessment/Plan   Diagnoses and all orders for this visit:  Chronic left-sided low back pain without sciatica  -     meloxicam (Mobic) 15 mg tablet; Take 1 tablet (15 mg) by mouth once daily.  Gastroesophageal reflux disease without esophagitis  -      omeprazole (PriLOSEC) 40 mg DR capsule; Take 1 capsule (40 mg) by mouth once daily in the morning. Take before meals. Do not crush or chew.

## 2025-01-18 DIAGNOSIS — M54.50 ACUTE LOW BACK PAIN, UNSPECIFIED BACK PAIN LATERALITY, UNSPECIFIED WHETHER SCIATICA PRESENT: ICD-10-CM

## 2025-01-20 RX ORDER — CYCLOBENZAPRINE HCL 10 MG
10 TABLET ORAL NIGHTLY PRN
Qty: 30 TABLET | Refills: 1 | Status: SHIPPED | OUTPATIENT
Start: 2025-01-20 | End: 2025-03-21

## 2025-02-06 ENCOUNTER — HOSPITAL ENCOUNTER (OUTPATIENT)
Dept: RADIOLOGY | Facility: HOSPITAL | Age: 45
Discharge: HOME | End: 2025-02-06
Payer: COMMERCIAL

## 2025-02-06 ENCOUNTER — ANCILLARY ORDERS (OUTPATIENT)
Dept: PRIMARY CARE | Facility: CLINIC | Age: 45
End: 2025-02-06

## 2025-02-06 ENCOUNTER — OFFICE VISIT (OUTPATIENT)
Dept: PRIMARY CARE | Facility: CLINIC | Age: 45
End: 2025-02-06
Payer: COMMERCIAL

## 2025-02-06 VITALS
OXYGEN SATURATION: 94 % | SYSTOLIC BLOOD PRESSURE: 120 MMHG | HEART RATE: 81 BPM | WEIGHT: 293 LBS | BODY MASS INDEX: 49.69 KG/M2 | DIASTOLIC BLOOD PRESSURE: 78 MMHG | TEMPERATURE: 97.4 F

## 2025-02-06 DIAGNOSIS — R22.0 SCALP MASS: Primary | ICD-10-CM

## 2025-02-06 DIAGNOSIS — E23.6 EMPTY SELLA TURCICA (MULTI): ICD-10-CM

## 2025-02-06 DIAGNOSIS — E55.9 VITAMIN D DEFICIENCY: ICD-10-CM

## 2025-02-06 DIAGNOSIS — R35.0 URINARY FREQUENCY: ICD-10-CM

## 2025-02-06 DIAGNOSIS — R53.83 OTHER FATIGUE: ICD-10-CM

## 2025-02-06 DIAGNOSIS — R22.0 SCALP MASS: ICD-10-CM

## 2025-02-06 LAB
POC APPEARANCE, URINE: CLEAR
POC BILIRUBIN, URINE: NEGATIVE
POC BLOOD, URINE: ABNORMAL
POC COLOR, URINE: YELLOW
POC GLUCOSE, URINE: NEGATIVE MG/DL
POC KETONES, URINE: NEGATIVE MG/DL
POC LEUKOCYTES, URINE: NEGATIVE
POC NITRITE,URINE: NEGATIVE
POC PH, URINE: 5.5 PH
POC PROTEIN, URINE: NEGATIVE MG/DL
POC SPECIFIC GRAVITY, URINE: 1.02
POC UROBILINOGEN, URINE: 0.2 EU/DL

## 2025-02-06 PROCEDURE — 81003 URINALYSIS AUTO W/O SCOPE: CPT | Performed by: INTERNAL MEDICINE

## 2025-02-06 PROCEDURE — 70250 X-RAY EXAM OF SKULL: CPT

## 2025-02-06 PROCEDURE — 1036F TOBACCO NON-USER: CPT | Performed by: INTERNAL MEDICINE

## 2025-02-06 PROCEDURE — 99214 OFFICE O/P EST MOD 30 MIN: CPT | Performed by: INTERNAL MEDICINE

## 2025-02-06 ASSESSMENT — ENCOUNTER SYMPTOMS
DYSURIA: 0
HEADACHES: 0
VOMITING: 0
FEVER: 0
FLANK PAIN: 0
FREQUENCY: 1
UNEXPECTED WEIGHT CHANGE: 0
CHILLS: 0
NAUSEA: 0
NERVOUS/ANXIOUS: 1
ROS SKIN COMMENTS: SEE HPI
HEMATURIA: 0
ABDOMINAL PAIN: 0
FATIGUE: 1
APPETITE CHANGE: 0
DIZZINESS: 0
BACK PAIN: 0
ACTIVITY CHANGE: 0

## 2025-02-06 ASSESSMENT — PAIN SCALES - GENERAL: PAINLEVEL_OUTOF10: 0-NO PAIN

## 2025-02-06 NOTE — PROGRESS NOTES
Subjective   Patient ID: Caity Pastrana is a 44 y.o. female who presents for Mass.    This is a 44 y.o. presenting with mass on the back of scalp that she noticed a couple of weeks ago. It was tender, but that resolved. She denies fever, chills, HA, dizziness.  She is also c/o urinary freq. She denies dysuria, hematuira, nausea, vomiting, back pain.  She is also c/o fatigue and would like labs ordered.         Review of Systems   Constitutional:  Positive for fatigue. Negative for activity change, appetite change, chills, fever and unexpected weight change.   Gastrointestinal:  Negative for abdominal pain, nausea and vomiting.   Genitourinary:  Positive for frequency. Negative for dysuria, flank pain, hematuria and urgency.   Musculoskeletal:  Negative for back pain.   Skin:         See HPI   Neurological:  Negative for dizziness and headaches.   Psychiatric/Behavioral:  The patient is nervous/anxious.        Objective   /78   Pulse 81   Temp 36.3 °C (97.4 °F)   Wt 146 kg (322 lb)   SpO2 94%   BMI 49.69 kg/m²     Physical Exam  Vitals reviewed.   Constitutional:       General: She is not in acute distress.     Appearance: Normal appearance. She is not ill-appearing.   HENT:      Head:      Comments: Mass noted on occipital scalp  Cardiovascular:      Rate and Rhythm: Normal rate and regular rhythm.      Heart sounds: Normal heart sounds.   Pulmonary:      Effort: Pulmonary effort is normal.      Breath sounds: Normal breath sounds.   Abdominal:      Palpations: Abdomen is soft.      Tenderness: There is no abdominal tenderness. There is no right CVA tenderness or left CVA tenderness.   Neurological:      General: No focal deficit present.      Mental Status: She is alert and oriented to person, place, and time.   Psychiatric:         Mood and Affect: Mood normal.         Assessment/Plan   Diagnoses and all orders for this visit:  Scalp mass  Comments:  Xray ordered  Other fatigue  -     CBC; Future  -      Comprehensive Metabolic Panel; Future  -     TSH with reflex to Free T4 if abnormal; Future  -     Vitamin B12; Future  Urinary frequency  -     POCT UA Automated manually resulted  -     Urine Culture; Future  Vitamin D deficiency  -     Vitamin D 25-Hydroxy,Total (for eval of Vitamin D levels); Future

## 2025-02-07 LAB
25(OH)D3+25(OH)D2 SERPL-MCNC: 24 NG/ML (ref 30–100)
ALBUMIN SERPL-MCNC: 4.2 G/DL (ref 3.6–5.1)
ALP SERPL-CCNC: 72 U/L (ref 31–125)
ALT SERPL-CCNC: 15 U/L (ref 6–29)
ANION GAP SERPL CALCULATED.4IONS-SCNC: 9 MMOL/L (CALC) (ref 7–17)
AST SERPL-CCNC: 14 U/L (ref 10–30)
BILIRUB SERPL-MCNC: 0.5 MG/DL (ref 0.2–1.2)
BUN SERPL-MCNC: 13 MG/DL (ref 7–25)
CALCIUM SERPL-MCNC: 9.2 MG/DL (ref 8.6–10.2)
CHLORIDE SERPL-SCNC: 106 MMOL/L (ref 98–110)
CO2 SERPL-SCNC: 24 MMOL/L (ref 20–32)
CREAT SERPL-MCNC: 0.82 MG/DL (ref 0.5–0.99)
EGFRCR SERPLBLD CKD-EPI 2021: 90 ML/MIN/1.73M2
ERYTHROCYTE [DISTWIDTH] IN BLOOD BY AUTOMATED COUNT: 13.3 % (ref 11–15)
GLUCOSE SERPL-MCNC: 121 MG/DL (ref 65–139)
HCT VFR BLD AUTO: 38.3 % (ref 35–45)
HGB BLD-MCNC: 12.8 G/DL (ref 11.7–15.5)
MCH RBC QN AUTO: 31.8 PG (ref 27–33)
MCHC RBC AUTO-ENTMCNC: 33.4 G/DL (ref 32–36)
MCV RBC AUTO: 95.3 FL (ref 80–100)
PLATELET # BLD AUTO: 289 THOUSAND/UL (ref 140–400)
PMV BLD REES-ECKER: 9.9 FL (ref 7.5–12.5)
POTASSIUM SERPL-SCNC: 4.3 MMOL/L (ref 3.5–5.3)
PROT SERPL-MCNC: 7 G/DL (ref 6.1–8.1)
RBC # BLD AUTO: 4.02 MILLION/UL (ref 3.8–5.1)
SODIUM SERPL-SCNC: 139 MMOL/L (ref 135–146)
TSH SERPL-ACNC: 1.07 MIU/L
VIT B12 SERPL-MCNC: 636 PG/ML (ref 200–1100)
WBC # BLD AUTO: 5.4 THOUSAND/UL (ref 3.8–10.8)

## 2025-02-07 RX ORDER — ASPIRIN 325 MG
50000 TABLET, DELAYED RELEASE (ENTERIC COATED) ORAL WEEKLY
Qty: 12 CAPSULE | Refills: 0 | Status: SHIPPED | OUTPATIENT
Start: 2025-02-07

## 2025-02-08 LAB
BACTERIA UR CULT: ABNORMAL
BACTERIA UR CULT: NORMAL

## 2025-02-16 ENCOUNTER — ANCILLARY ORDERS (OUTPATIENT)
Dept: PRIMARY CARE | Facility: CLINIC | Age: 45
End: 2025-02-16
Payer: COMMERCIAL

## 2025-02-16 ENCOUNTER — HOSPITAL ENCOUNTER (OUTPATIENT)
Dept: RADIOLOGY | Facility: HOSPITAL | Age: 45
Discharge: HOME | End: 2025-02-16
Payer: COMMERCIAL

## 2025-02-16 DIAGNOSIS — E23.6 EMPTY SELLA TURCICA (MULTI): ICD-10-CM

## 2025-02-16 DIAGNOSIS — R53.83 OTHER FATIGUE: ICD-10-CM

## 2025-02-16 DIAGNOSIS — R35.0 URINARY FREQUENCY: ICD-10-CM

## 2025-02-16 DIAGNOSIS — E55.9 VITAMIN D DEFICIENCY: ICD-10-CM

## 2025-02-16 DIAGNOSIS — R22.0 SCALP MASS: Primary | ICD-10-CM

## 2025-02-16 DIAGNOSIS — E23.7 PITUITARY ABNORMALITY (MULTI): ICD-10-CM

## 2025-02-16 PROCEDURE — A9575 INJ GADOTERATE MEGLUMI 0.1ML: HCPCS | Performed by: INTERNAL MEDICINE

## 2025-02-16 PROCEDURE — 2550000001 HC RX 255 CONTRASTS: Performed by: INTERNAL MEDICINE

## 2025-02-16 PROCEDURE — 70553 MRI BRAIN STEM W/O & W/DYE: CPT | Performed by: RADIOLOGY

## 2025-02-16 PROCEDURE — 70553 MRI BRAIN STEM W/O & W/DYE: CPT

## 2025-02-16 RX ORDER — GADOTERATE MEGLUMINE 376.9 MG/ML
29 INJECTION INTRAVENOUS
Status: COMPLETED | OUTPATIENT
Start: 2025-02-16 | End: 2025-02-16

## 2025-02-16 RX ADMIN — GADOTERATE MEGLUMINE 29 ML: 376.9 INJECTION INTRAVENOUS at 10:25

## 2025-02-19 ENCOUNTER — PATIENT MESSAGE (OUTPATIENT)
Dept: PRIMARY CARE | Facility: CLINIC | Age: 45
End: 2025-02-19
Payer: COMMERCIAL

## 2025-02-19 DIAGNOSIS — R22.0 SCALP MASS: Primary | ICD-10-CM

## 2025-02-19 DIAGNOSIS — E23.6 EMPTY SELLA (MULTI): ICD-10-CM

## 2025-02-19 LAB — PROLACTIN SERPL-MCNC: <1 NG/ML

## 2025-03-03 ENCOUNTER — PATIENT MESSAGE (OUTPATIENT)
Dept: PRIMARY CARE | Facility: CLINIC | Age: 45
End: 2025-03-03
Payer: COMMERCIAL

## 2025-03-03 DIAGNOSIS — F41.9 ANXIETY: ICD-10-CM

## 2025-03-03 DIAGNOSIS — G47.00 INSOMNIA, UNSPECIFIED TYPE: ICD-10-CM

## 2025-03-03 RX ORDER — BUSPIRONE HYDROCHLORIDE 7.5 MG/1
7.5 TABLET ORAL DAILY
Qty: 30 TABLET | Refills: 11 | Status: SHIPPED | OUTPATIENT
Start: 2025-03-03 | End: 2026-03-03

## 2025-03-03 RX ORDER — ZOLPIDEM TARTRATE 10 MG/1
10 TABLET ORAL NIGHTLY PRN
Qty: 30 TABLET | Refills: 5 | Status: SHIPPED | OUTPATIENT
Start: 2025-03-03 | End: 2025-10-29

## 2025-03-05 ENCOUNTER — APPOINTMENT (OUTPATIENT)
Dept: NEUROLOGY | Facility: HOSPITAL | Age: 45
End: 2025-03-05
Payer: COMMERCIAL

## 2025-03-06 ENCOUNTER — APPOINTMENT (OUTPATIENT)
Dept: NEUROSURGERY | Facility: CLINIC | Age: 45
End: 2025-03-06
Payer: COMMERCIAL

## 2025-03-06 ENCOUNTER — APPOINTMENT (OUTPATIENT)
Dept: NEUROSURGERY | Facility: HOSPITAL | Age: 45
End: 2025-03-06
Payer: COMMERCIAL

## 2025-03-13 ENCOUNTER — OFFICE VISIT (OUTPATIENT)
Dept: NEUROSURGERY | Facility: CLINIC | Age: 45
End: 2025-03-13
Payer: COMMERCIAL

## 2025-03-13 VITALS
HEART RATE: 81 BPM | SYSTOLIC BLOOD PRESSURE: 118 MMHG | TEMPERATURE: 96.6 F | RESPIRATION RATE: 20 BRPM | DIASTOLIC BLOOD PRESSURE: 83 MMHG

## 2025-03-13 DIAGNOSIS — R22.0 SCALP MASS: ICD-10-CM

## 2025-03-13 DIAGNOSIS — E23.6 PITUITARY MASS (MULTI): Primary | ICD-10-CM

## 2025-03-13 PROCEDURE — 99214 OFFICE O/P EST MOD 30 MIN: CPT | Performed by: NEUROLOGICAL SURGERY

## 2025-03-13 PROCEDURE — 99204 OFFICE O/P NEW MOD 45 MIN: CPT | Performed by: NEUROLOGICAL SURGERY

## 2025-03-13 PROCEDURE — 1036F TOBACCO NON-USER: CPT | Performed by: NEUROLOGICAL SURGERY

## 2025-03-13 ASSESSMENT — PAIN SCALES - GENERAL: PAINLEVEL_OUTOF10: 2

## 2025-03-13 NOTE — PROGRESS NOTES
[facility]  Neurosurgery    History of Present Illness      Caity Pastrana is a 44-year-old female with a PMH significant for HLD, BONNIE, GERD, general anxiety disorder, LBD, migraine headache, insomnia, recently evaluated by her PCP for mass on the back of her scalp. She was recommended for MR imaging where she was found to have a partially empty sella with hypoenhancement along the inferior leftward aspect of the gland. Due to finding patient was referred to neurosurgery for further evaluation.     She reports minimal headaches, denies weakness numbness tingling or visual changes. Denies hormonal changes, reports last menses were over 6 months ago, denies stretch marks, shoe/hat size change, galactorrhea.             Objective      Vitals:   /83   Pulse 81   Temp 35.9 °C (96.6 °F)   Resp 20         Physical Exam:    Awake, alert, oriented times 3.   EOM intact  Pupils are equal round and reactive  Intact facial sensation  VA: 20/15 bilaterally  VF: full to confrontation  No facial asymmetry  Intact hearing bilateraly  Midline tongue protrusion  Symmetric shoulder elevation  Symmetric head turning    5/5 in all extremities  No sensory deficits          Relevant Results:  I personally reviewed the imaging showing empty sella syndrome but without clinical signs of elevated intracranial pressure.   MRI sella w/wo 02/16/25:             Assessment & Plan      Diagnosis:  Diagnoses and all orders for this visit:  Scalp mass  -     Referral to Neurosurgery          Provider Impression:   Mrs Pastrana presents to clinic for evaluation of empty sella syndrome. Her MRI is not suggestive of a pituitary adenoma, however we will check the rest of the endocrine panel to be sure. She has occasional headaches but they are not concerning for Idiopathic intracranial hypertension. If her headaches worsen, we discussed seeing a headache neurologist for further workup.       Medical History     Past Medical History:    Diagnosis Date    Allergic rhinitis     CTS (carpal tunnel syndrome)     Depression     Dizziness     GERD (gastroesophageal reflux disease)     Migraine     Personal history of other diseases of the digestive system     History of esophageal reflux    Personal history of other diseases of urinary system     History of bladder problems    Personal history of other mental and behavioral disorders     History of depression    Sleep difficulties     Urinary tract infection      Past Surgical History:   Procedure Laterality Date    BREAST RECONSTRUCTION Bilateral     breast reduction in 2005    CHOLECYSTECTOMY  01/06/2014    Cholecystectomy    COLPOSCOPY      PLANTAR FASCIA SURGERY Right 2013    TUBAL LIGATION  01/06/2014    Tubal Ligation     Social History     Tobacco Use    Smoking status: Never     Passive exposure: Past    Smokeless tobacco: Never   Vaping Use    Vaping status: Never Used   Substance Use Topics    Alcohol use: Yes     Alcohol/week: 1.0 standard drink of alcohol     Types: 1 Glasses of wine per week     Comment: Social drinker    Drug use: Never     Family History   Problem Relation Name Age of Onset    Arthritis Mother Nazia Pastrana     Breast cancer Mother Nazia Pastrana     Other (Ovarian cancer) Mother Nazia Pastrana     Diabetes Mother Nazia Pastrana     Mental illness Mother Nazia Pastrana     Ovarian cancer Mother Nazia Pastrana     Cancer Mother Nazia Pastrana      Allergies   Allergen Reactions    Amoxicillin-Pot Clavulanate GI Upset and Headache    Doxycycline Monohydrate Dizziness, GI Upset and Headache    Metformin Blurred vision and Headache    Nickel Itching    Oxycodone-Acetaminophen Nausea/vomiting    Sulfa (Sulfonamide Antibiotics) Unknown    Tramadol Unknown    Bee Pollen Rash    Levofloxacin Dizziness, GI Upset, Headache, Other and Palpitations    Naproxen Rash     Current Outpatient Medications   Medication Instructions    azelastine (Astelin) 137 mcg (0.1 %) nasal spray 1 spray, Each Nostril, 2  times daily PRN, Use in each nostril as directed    busPIRone (BUSPAR) 7.5 mg, oral, Daily    cetirizine (ZYRTEC) 10 mg, oral, Daily PRN    cholecalciferol (VITAMIN D-3) 50,000 Units, oral, Weekly    cyclobenzaprine (FLEXERIL) 10 mg, oral, Nightly PRN    ergocalciferol (Vitamin D-2) 1.25 MG (80973 UT) capsule 1 capsule, Every 7 days    fluticasone (Flonase) 50 mcg/actuation nasal spray 1 spray, Each Nostril, 2 times daily, Shake gently. Before first use, prime pump. After use, clean tip and replace cap.    meloxicam (MOBIC) 15 mg, oral, Daily    omeprazole (PRILOSEC) 40 mg, oral, Daily before breakfast, Do not crush or chew.    sod chloride-sodium bicarb (Neilmed Sinus Rinse Complete) nasal rinse Irrigate your nose per instructions twice daily    triamcinolone (Nasacort) 55 mcg nasal inhaler 2 sprays, Each Nostril, Daily    zolpidem (AMBIEN) 10 mg, oral, Nightly PRN

## 2025-03-17 ENCOUNTER — APPOINTMENT (OUTPATIENT)
Dept: ENDOCRINOLOGY | Facility: CLINIC | Age: 45
End: 2025-03-17
Payer: COMMERCIAL

## 2025-03-18 ENCOUNTER — TELEMEDICINE (OUTPATIENT)
Dept: ENDOCRINOLOGY | Facility: CLINIC | Age: 45
End: 2025-03-18
Payer: COMMERCIAL

## 2025-03-18 VITALS — BODY MASS INDEX: 44.41 KG/M2 | WEIGHT: 293 LBS | HEIGHT: 68 IN

## 2025-03-18 DIAGNOSIS — E66.813 CLASS 3 SEVERE OBESITY WITHOUT SERIOUS COMORBIDITY WITH BODY MASS INDEX (BMI) OF 45.0 TO 49.9 IN ADULT, UNSPECIFIED OBESITY TYPE: Primary | ICD-10-CM

## 2025-03-18 DIAGNOSIS — L68.0 HIRSUTISM: ICD-10-CM

## 2025-03-18 DIAGNOSIS — E66.01 CLASS 3 SEVERE OBESITY WITHOUT SERIOUS COMORBIDITY WITH BODY MASS INDEX (BMI) OF 45.0 TO 49.9 IN ADULT, UNSPECIFIED OBESITY TYPE: Primary | ICD-10-CM

## 2025-03-18 DIAGNOSIS — R63.5 WEIGHT GAIN: ICD-10-CM

## 2025-03-18 PROCEDURE — 1036F TOBACCO NON-USER: CPT | Performed by: NURSE PRACTITIONER

## 2025-03-18 PROCEDURE — 3008F BODY MASS INDEX DOCD: CPT | Performed by: NURSE PRACTITIONER

## 2025-03-18 PROCEDURE — 99204 OFFICE O/P NEW MOD 45 MIN: CPT | Performed by: NURSE PRACTITIONER

## 2025-03-18 ASSESSMENT — ENCOUNTER SYMPTOMS
FATIGUE: 0
LOSS OF SENSATION IN FEET: 0
POLYDIPSIA: 0
CONSTIPATION: 0
ARTHRALGIAS: 0
NUMBNESS: 0
DYSPHORIC MOOD: 0
DEPRESSION: 1
OCCASIONAL FEELINGS OF UNSTEADINESS: 0
FREQUENCY: 0
POLYPHAGIA: 0
SHORTNESS OF BREATH: 0
NERVOUS/ANXIOUS: 0
WHEEZING: 0
PALPITATIONS: 0
NAUSEA: 0

## 2025-03-18 ASSESSMENT — PATIENT HEALTH QUESTIONNAIRE - PHQ9
10. IF YOU CHECKED OFF ANY PROBLEMS, HOW DIFFICULT HAVE THESE PROBLEMS MADE IT FOR YOU TO DO YOUR WORK, TAKE CARE OF THINGS AT HOME, OR GET ALONG WITH OTHER PEOPLE: NOT DIFFICULT AT ALL
1. LITTLE INTEREST OR PLEASURE IN DOING THINGS: NOT AT ALL
2. FEELING DOWN, DEPRESSED OR HOPELESS: SEVERAL DAYS
SUM OF ALL RESPONSES TO PHQ9 QUESTIONS 1 AND 2: 1

## 2025-03-18 NOTE — PATIENT INSTRUCTIONS
Nutrition: Dietitian consult.   Physical exercise: 3 -4 days of exercise for stress release, doing classes at the [facility], or do videos at home YouTube: [employer], [employer], do some walking  Medications: see below for information on medical weight loss medications. Call your insurance plan to see the weight management medications they cover. The Victoza, Wegovy, or Zepbound are ideal options with Metabolic Disease  Follow up: Follow up with lab work from neurosurgery, dietitians visit and then the group visit        [facility]: Obesity [facility]  Today we discussed some of the following medications.  If not prescribed already, please look into these medications on your own, and please call your insurance for coverage questions.  If you would like to contact our office with additional questions or a request for a prescription, write us via a Sent[employer] message, or call [phone].    The following summarizes the medications currently available for weight management:    q    Phentermine (aka Adipex):   This medication is a stimulant and can suppress appetite. Common side effects include an increase in blood pressure (BP) and heart rate (HR), and excessive thirst. You will need to be able to monitor both BP and HR while on this medication. If you have any cardiac issues you may need to contact your cardiologist/PCP to get authorization to take the medication. This medication is a controlled substance in the State of [place]. Per the law, you will need to sign a stimulant consent form when taking Phentermine.  Additionally, you will need to be seen in the office (in person) every 3 months when on this medication.  Please schedule appointments in advance to ensure that we comply with the [place] Law. Phentermine is usually the most cost effective of the appetite suppressants (usually no more than $40/month and can sometimes use GoodRX).  q Qsymia:   This is a combination of two medications: Phentermine and  Topamax (aka Topiramate). Topamax is often given for migraine headaches and additionally contributes to appetite suppression.  Since phentermine is part of Qsymia, this medication combination is also considered a controlled substance.  A stimulant consent will need to be signed upon initiation of this medication. Similar to phentermine alone, Qsymia also requires an in person office visit every 3 months.  Schedule visits in advance to comply with the [place] Law.  The cost of the medication depends on the patient's individual insurance but if too costly, the medication can be sent to an online mail order pharmacy (directions below) for approximately $100/month. [email or web address]  you will need to go to this website, register in the top right hand corner. The online pharmacy will contact you for billing info and our office can electronically send the script to them.   q Contrave:  This medication is a combination of Wellbutrin (aka Buproprion) and Naltrexone. The medication combination helps to decrease appetite and sometimes cravings as well.   Contrave can cause opioid withdrawal.  Patients using chronic opioid therapy should not sure this medication.  Those who use temporary opioid therapy should hold contrave until 14 days after their last opioid dose.  When taking Contrave, a titration process is needed over the first month.   Titration:  For the first week you will take one tablet in the AM,  For the 2nd week you will take one tablet in the AM and one tablet in the PM,   For the 3rd week you will take 2 tablets in the AM and one tablet in the PM, and   For the fourth week (and onward) you will take 2 tablets in the AM and 2 tablets in the PM.     The cost of this medication depends on the individual's insurance. There are 2 options if the medication is not affordable at the retail pharmacy:  It can be sent to a mail order pharmacy that approximates to ~ $100/month.    Mail order pharmacy-  [email or web address] You will need to go to this website to register and our office will send an electronic script.  Our office can send each individual medication (Wellbutrin, Naltrexone) to your local   pharmacy.  If sending the two individual medications to your local pharmacy, the prescriptions will appear as: Wellbutrin 150mg tablet taken daily and Naltrexone 50mg- start taking half tablet for one week and then increase to one tablet daily.   q Wegovy:   This medication is a weekly injection (and actually contains the same ingredient as in Ozempic). This medication is FDA approved for weight loss.  Common side effects include nausea, vomiting, diarrhea or constipation and abdominal pain.  Often these GI side effects resolve with time.  Some patients have shared that injecting the medicine to the thigh area instead of the stomach area helps with the GI side effects.  Do not take this medication if you have a history of pancreatitis.  Additionally, do not take this medication if you or any family members have been diagnosed with Medullary Thyroid Cancer or Multiple Endocrine Neoplasia.  If insurance coverage is poor, retail price for this medication is approximately ~$1200/month.  q Zepbound:   This medication is a weekly injection (and actually contains the same ingredient as in Mounjaro). This medication is FDA approved for weight loss.  Common side effects include nausea, vomiting, diarrhea or constipation and abdominal pain.  Often these GI side effects resolve with time.  Some patients have shared that injecting the medicine to the thigh area instead of the stomach area helps with the GI side effects.  Do not take this medication if you have a history of pancreatitis.  Additionally do not take this medication if you or any family members have been diagnosed with Medullary Thyroid Cancer or Multiple Endocrine Neoplasia.  If insurance coverage is poor, retail price for this medication is  approximately ~$1200/month.  q Saxenda:   This medication is a daily injection.  This medication is FDA approved for weight loss.  Common side effects include nausea, vomiting, diarrhea or constipation and abdominal pain.  Often these GI side effects resolve with time.  Some patients have shared that injecting the medicine to the thigh area instead of the stomach area helps with the GI side effects.  Do not take this medication if you have a history of pancreatitis.  Additionally, do not take this medication if you or any family members have been diagnosed with Medullary Thyroid Cancer or Multiple Endocrine Neoplasia.  If insurance coverage is poor, retail price for this medication is approximately ~$1200/month.        q Ozempic:  This is a diabetes medication and is administered as a weekly injection. It is often NOT covered by insurance unless you are officially diagnosed with type 2 diabetes.  Common side effects include nausea, vomiting, diarrhea or constipation and abdominal pain.  Often these GI side effects resolve with time.  Some patients have shared that injecting the medicine to the thigh area instead of the stomach area helps with the GI side effects.  Do not take this medication if you have a history of pancreatitis.  Additionally, do not take this medication if you or any family members have been diagnosed with Medullary Thyroid Cancer or Multiple Endocrine Neoplasia.  If insurance coverage is poor, retail price for this medication is approximately ~$1200/month.  q Mounjaro:   This is a diabetes medication and is administered as a weekly injection. It is often NOT covered by insurance unless you are officially diagnosed with type 2 diabetes.  Common side effects include nausea, vomiting, diarrhea or constipation and abdominal pain.  Often these GI side effects resolve with time.  Some patients have shared that injecting the medicine to the thigh area instead of the stomach area helps with the GI side  effects.  Do not take this medication if you have a history of pancreatitis.  Additionally, do not take this medication if you or any family members have been diagnosed with Medullary Thyroid Cancer or Multiple Endocrine Neoplasia.  If insurance coverage is poor, retail price for this medication is approximately ~$1200/month.      Please always review the official side effect profile for the above medications with the Pharmacist if further questions arise.

## 2025-03-18 NOTE — PROGRESS NOTES
"This is a virtual visit where physical exam is limited and ROS and hx is obtained.    Caity Pastrana presents for weight loss management and obesity consult today. She was referred for challenges in weight since she was in her late 30's.    She was most recently in the weight program at [facility]    Additionally, she reports she recently seen by neurosurgery at   She reports she had a mass at the back of the head and was referred to neurosurgery    In review of the neurosurgery consult on 3/13/25 it is noted that her PCP noted head mass, referred her to MRI where there was \"empty sella syndrome\" noted. She was then referred to neurosurgery for evaluation.  Neurosurgery reviewed the MRI and noted the MRI was not suggestive of a pituitary adenoma.  AM Cortisol was ordered and results are wnl  ACTH is pending. Prolactin is decreased  CT abdomen in 2022- wnl    She reports in the neurosurgery consult she was asked about having  hirsutism    She notes she does have hirsutism on neck and chin for a number of years and routine does interventions to remove it  She reports she has not had menses since 2017 and prior to this the menses would skip a number of months then went away (irregular menses then amenorrhea)    PMH: Obesity, anxiety    Obesity History:  Childhood or teen obesity history: no however she says \"I was always the bigger one   Age of Onset: mid adult years, pregnancy she gained weight and initially pregnancies she lost the weight and was 89 then at 37 increased with stress of being single parent  Lowest adult weight: 189  Highest adult weight: 322  Current weight: 322     Biggest challenge with weight management: ongoing efforts to lose weight without ability to do so     History of Weight Loss Efforts: yes  Successful weight loss techniques attempted: prescription appetite suppressants: Victoza leading to 10 pound and supervised diet program    She took victoza but reports that taking it every day was not " "ideal for her with her skin    Unsuccessful weight loss techniques attempted:  protein modified diet at [facility] los 6 pounds,trainers     Current typical daily diet:  Typical Breakfast: greek yogurt, avocado taost, bagel  Typical Lunch: salad, chicken breast, Chipotle bowl  Typical Dinner: meat, vegetable, bread, potato  Soda/latte weekly intake: wine 2-3 times a month socially  Take out/carry out/fast food weekly: Chipotle 2 times a month  Portion sizes/seconds with meals: medium for dinner, lunch and breakfast smaller    Snacking  Daytime snacks: yes \"pretzels and popcorn in the day\"  Evening snacks: no      Describe Appetite (always hungry/no hunger/average): average, before meals  Are you full after a meal?  yes  Food Noise:  No  Emotional eater? No   Boredom eater? No     Current Exercise Habits none    Sleep patterns (insomnia, waking in night) /hours of sleep per night: she denies BONNIE, has insomnia related to anxiety, uses ambien  H/O Sleep apnea: no  Well rested?  sometimes    Increased Stressors (describe daily stress): yes      Any intake of an appetite suppressant or an anti-obesity medicine? No     H/O Pancreatitis: no  H/O Thyroid Medullary Cancer: no    Past Medical History:   Diagnosis Date    Allergic rhinitis     CTS (carpal tunnel syndrome)     Depression     Dizziness     GERD (gastroesophageal reflux disease)     Migraine     Personal history of other diseases of the digestive system     History of esophageal reflux    Personal history of other diseases of urinary system     History of bladder problems    Personal history of other mental and behavioral disorders     History of depression    Sleep difficulties     Urinary tract infection        Current Outpatient Medications   Medication Sig Dispense Refill    busPIRone (Buspar) 7.5 mg tablet Take 1 tablet (7.5 mg) by mouth once daily. 30 tablet 11    cetirizine (ZyrTEC) 10 mg tablet Take 1 tablet (10 mg) by mouth once daily as needed for allergies. " 30 tablet 11    cholecalciferol (Vitamin D-3) 50,000 unit capsule Take 1 capsule (50,000 Units) by mouth 1 (one) time per week. 12 capsule 0    cyclobenzaprine (Flexeril) 10 mg tablet Take 1 tablet (10 mg) by mouth as needed at bedtime for muscle spasms. 30 tablet 1    ergocalciferol (Vitamin D-2) 1.25 MG (88245 UT) capsule Take 1 capsule (1,250 mcg) by mouth every 7 days.      fluticasone (Flonase) 50 mcg/actuation nasal spray Administer 1 spray into each nostril 2 times a day. Shake gently. Before first use, prime pump. After use, clean tip and replace cap. 48 g 3    meloxicam (Mobic) 15 mg tablet Take 1 tablet (15 mg) by mouth once daily. 30 tablet 3    omeprazole (PriLOSEC) 40 mg DR capsule Take 1 capsule (40 mg) by mouth once daily in the morning. Take before meals. Do not crush or chew. 30 capsule 3    sod chloride-sodium bicarb (Neilmed Sinus Rinse Complete) nasal rinse Irrigate your nose per instructions twice daily 1 each 3    triamcinolone (Nasacort) 55 mcg nasal inhaler Administer 2 sprays into each nostril once daily. 16.5 g 11    zolpidem (Ambien) 10 mg tablet Take 1 tablet (10 mg) by mouth as needed at bedtime for sleep. 30 tablet 5    azelastine (Astelin) 137 mcg (0.1 %) nasal spray Administer 1 spray into each nostril 2 times a day as needed for rhinitis or allergies. Use in each nostril as directed 30 mL 5     No current facility-administered medications for this visit.           Review of Systems  Review of Systems   Constitutional:  Negative for fatigue.   Respiratory:  Negative for shortness of breath and wheezing.    Cardiovascular:  Negative for chest pain and palpitations.   Gastrointestinal:  Negative for constipation and nausea.   Endocrine: Negative for polydipsia, polyphagia and polyuria.   Genitourinary:  Negative for frequency and urgency.   Musculoskeletal:  Negative for arthralgias.   Skin:  Negative for rash.   Neurological:  Negative for numbness.   Psychiatric/Behavioral:  Negative  "for dysphoric mood. The patient is not nervous/anxious.            Objective   Ht 1.715 m (5' 7.5\")   Wt 146 kg (322 lb) Comment: per patient  BMI 49.69 kg/m²     Physical Exam  Physical Exam  Neurological:      Mental Status: She is alert and oriented to person, place, and time.   Psychiatric:         Mood and Affect: Mood normal.         Behavior: Behavior normal.         Thought Content: Thought content normal.         Judgment: Judgment normal.         Lab Review  Lab Results   Component Value Date    HGBA1C 6.1 (H) 09/13/2023     Lab Results   Component Value Date    LDLCALC 122 01/10/2023       Weight Trends  Trends of weight reviewed in visit, see graph below:  Wt Readings from Last 3 Encounters:   03/18/25 146 kg (322 lb)   02/16/25 146 kg (322 lb)   02/06/25 146 kg (322 lb)      09/29/24 12:33 10/01/24 08:17 10/15/24 12:56 11/21/24 13:26   Weight 145 kg (320 lb) 144 kg (317 lb) 144 kg (317 lb) 147 kg (324 lb)       Assessment/Plan     Follow up and Goals:    Nutrition: Dietitian consult.   Physical exercise: 3 -4 days of exercise for stress release, doing classes at the [facility], or do videos at home YouTube: [employer], [employer], do some walking  Medications: see below for information on medical weight loss medications. Call your insurance plan to see the weight management medications they cover. The Victoza, Wegovy, or Zepbound are ideal options with Metabolic Disease  Follow up: Follow up with lab work from neurosurgery, dietitians visit and then the group visit. Referred to Dr. Lachelle Sheth endocrinologist regarding further work up and scheduled    Visit length of 45 minutes      Problem List Items Addressed This Visit       Weight gain    Relevant Orders    Follow Up In Endocrinology    Class 3 severe obesity without serious comorbidity with body mass index (BMI) of 45.0 to 49.9 in adult - Primary    Relevant Orders    Referral to Nutrition Services    Follow Up In Endocrinology    Hirsutism       Diet " interventions: referral to dietitian for guidance in these changes  Discussed Mediterranean Diet, given pamphlet or it will be mailed, we looked at ADA plate, portion sizes, recipes. Advised to review in preparation for nutrition consult    Handouts given:  yes    Exercise intervention:   We discussed the importance of incorporating resistance and free weight  lifting into physical activity routine to prevent muscle wasting with weight loss, enhance bone health, the positive role increased muscle has in burning fat at rest. We looked at examples of these types of exercise routines online. Advised to do modifications. Advised to check with PCP if there is a h/o musculoskeletal injury or hx. We discussed benefits of walking with weight loss and as a cardio form of exercise. Gradually increase exercise to a goal of 150 minutes at least per week.        Follow Up:  Referred to nutrition or continue to work with current dietitian   Discussed obesity medications, side effects and mechanism of action reviewed with patient  Discussed physical activity: we reviewed [employer] videos for strength training, advised to use modifications for these videos, we discussed benefits of strength training and resistance bands  on bone and muscle health, we discussed walking and water aerobic options for cardio  Discussed Mediterranean Diet, given pamphlet or it will be mailed, we looked at ADA plate, portion sizes, recipes. Advised to review Mediterranean Meal Plan booklet  in preparation for nutrition consult  ....  1 month group visit and nutrition visit in person or  virtual  Please reach out if you need anything or have further questions

## 2025-03-19 LAB
ACTH PLAS-MCNC: 29 PG/ML (ref 6–50)
CORTIS AM PEAK SERPL-MCNC: 20.5 MCG/DL
FSH SERPL-ACNC: 21 MIU/ML
GH SERPL-MCNC: 0.3 NG/ML
LH SERPL-ACNC: 8.4 MIU/ML
PROLACTIN SERPL-MCNC: <1 NG/ML
TSH SERPL-ACNC: 1.53 MIU/L

## 2025-03-26 ENCOUNTER — APPOINTMENT (OUTPATIENT)
Dept: ENDOCRINOLOGY | Facility: CLINIC | Age: 45
End: 2025-03-26
Payer: COMMERCIAL

## 2025-03-26 VITALS — HEIGHT: 68 IN | BODY MASS INDEX: 44.41 KG/M2 | WEIGHT: 293 LBS

## 2025-03-26 DIAGNOSIS — E66.01 CLASS 3 SEVERE OBESITY WITHOUT SERIOUS COMORBIDITY WITH BODY MASS INDEX (BMI) OF 45.0 TO 49.9 IN ADULT, UNSPECIFIED OBESITY TYPE: ICD-10-CM

## 2025-03-26 DIAGNOSIS — E66.813 CLASS 3 SEVERE OBESITY WITHOUT SERIOUS COMORBIDITY WITH BODY MASS INDEX (BMI) OF 45.0 TO 49.9 IN ADULT, UNSPECIFIED OBESITY TYPE: ICD-10-CM

## 2025-03-26 DIAGNOSIS — Z71.3 DIETARY COUNSELING: ICD-10-CM

## 2025-03-26 DIAGNOSIS — R73.03 PREDIABETES: Primary | ICD-10-CM

## 2025-03-26 NOTE — PATIENT INSTRUCTIONS
- Please refer to your book entitled: Your Mediterranean Meal Plan, and follow Mediterranean Diet eating guidelines as reviewed.  - The Healthy Plate style of eating can be a helpful tool for incorporating healthy balanced meals in appropriate portions. (Healthy Plate: Start with a 9-inch diameter plate. Fill 1/2 the plate with non-starchy vegetables, 1/4 of the plate with whole grains or starchy vegetables, and 1/4 of the plate with a lean source of protein.   - Please aim for a source of healthy protein and fiber rich foods at meals as discussed for nutrition needs as well as to help provide better satiety at meals.   - Consider pre-planning healthy meals for the week. Refer to your book for both menu and recipe ideas to get you started.  - Incorporate strategies of mindful eating every day. Practice staying in tune with your body's hunger cues and eat only when truly hungry. Avoid emotional eating/eating when not hungry.  - Aim for 64 ounces of water daily, limit sugar sweetened beverages to once daily.  - Aim for 150 minutes of moderate-intensity physical activity per week. Resistance training is encouraged at least twice weekly.  - Follow-up as scheduled for the group classes with JAN Castellanos   - Follow-up with nutrition in 4-6 weeks.

## 2025-03-26 NOTE — PROGRESS NOTES
"Initial Nutrition Assessment    Patient was referred to nutrition by JAN Castellanos  for weight management/desire to lose weight, as well as for education on healthy eating. Other PMHX significant for Prediabetes, anxiety, HLD, GERD, and Hirsutism. Pertinent labs reviewed which show A1C 6.1%.     Diet recall reveals an inconsistent meal pattern with frequently missed meals, as well as reported intakes of larger portions and calorically dense foods all likely contributing to lack of desired weight loss/contributing to weight gain over time. Fluids not fully meeting recommendations in type and amount as pt reports regular consumption of agave, further likely limiting desired weight loss. Pt is not incorporating consistent physical activity at this time and would likely benefit from increased structured activity to assist in achieving goals.       See all interventions/recommendations below as discussed during visit this day.     Patient reported symptoms: Difficulty losing weight    Anthropometrics:  Height:   Ht Readings from Last 1 Encounters:   03/18/25 1.715 m (5' 7.5\")      Weight:   Wt Readings from Last 10 Encounters:   03/18/25 146 kg (322 lb)   02/16/25 146 kg (322 lb)   02/06/25 146 kg (322 lb)   01/08/25 145 kg (319 lb 3.2 oz)   12/26/24 144 kg (318 lb)   11/21/24 147 kg (324 lb)   10/15/24 144 kg (317 lb)   10/01/24 144 kg (317 lb)   09/29/24 145 kg (320 lb)   09/10/24 145 kg (320 lb 1.6 oz)      Current BMI:   BMI Readings from Last 1 Encounters:   03/18/25 49.69 kg/m²        Labs:  Lab Results   Component Value Date    HGBA1C 6.1 (H) 09/13/2023      Lab Results   Component Value Date    CHOL 179 01/10/2023     Lab Results   Component Value Date    HDL 40 (L) 01/10/2023     Lab Results   Component Value Date    LDLCALC 122 01/10/2023     Lab Results   Component Value Date    TRIG 87 01/10/2023       Malnutrition Screening:  Significant Unintentional weight loss: No  Eating less than 75% of usual " intake for more than 2 weeks: No  Potential Signs of Inflammation: No    Recommended Malnutrition Diagnosis: No malnutrition identified    Diet Recall-  Breakfast- everything bagel and greek yogurt OR avocado wheat or rye toast/egg and sometimes carver   Lunch- packs lunch- salad with chicken tenders or Tuna and crackers or Chipotle or pizza Or Skip or sandwich turkey on wheat   Dinner- chicken/turkey/fish with asparagus or broccoli and baked potato or french fries or Tacos   Daily Snacks- banana, orange, and strawberries/blackberries, Pretzles or chips   Beverages- 2-3 L water and 2-3 cups hot tea \ Agave nectar 1.5 T  Alcohol- social - wine 2-3 times per month     Physical Activity- none     Other pertinent patient reported Information:  - Past weight loss attempts include: lost weight in 30's after watching portion sizes. Saw a  but stopped due to being frustrated.   - Working 3 jobs and runs around a lot on the weekends and will often skip breakfast and/or lunch. May have a protein shake with garden of life protein powder and banana and peanut butter Or frozen berries and almond milk and banana and agave.    Nutrition Diagnosis: Overweight/Obesity related to food-and-nutrition-related knowledge deficit and physical inactivity as evidenced by BMI above normative standard for age and gender.    Readiness to Learn:  Cognitive ability: Alert and oriented  Motivation to learn: Eager and Interested  Family Support: Unable to assess- family not present  Instruction provided to: Patient  Patient learns best by: multiple methods   Factors affecting learning: None   Physical limitations affecting learning: Pain    Education Materials Provided:   Your Mediterranean Meal Plan Booklet    Nutrition Interventions/Recommendations for 3/26/2025:  - Please refer to your book entitled: Your Mediterranean Meal Plan, and follow Mediterranean Diet eating guidelines as reviewed.  - The Healthy Plate style of eating can  be a helpful tool for incorporating healthy balanced meals in appropriate portions. (Healthy Plate: Start with a 9-inch diameter plate. Fill 1/2 the plate with non-starchy vegetables, 1/4 of the plate with whole grains or starchy vegetables, and 1/4 of the plate with a lean source of protein.   - Please aim for a source of healthy protein and fiber rich foods at meals as discussed for nutrition needs as well as to help provide better satiety at meals.   - Consider pre-planning healthy meals for the week. Refer to your book for both menu and recipe ideas to get you started.  - Incorporate strategies of mindful eating every day. Practice staying in tune with your body's hunger cues and eat only when truly hungry. Avoid emotional eating/eating when not hungry.  - Aim for 64 ounces of water daily, limit sugar sweetened beverages to once daily.  - Aim for 150 minutes of moderate-intensity physical activity per week. Resistance training is encouraged at least twice weekly.  - Follow-up as scheduled for the group classes with JAN Castellanos   - Follow-up with nutrition in 4-6 weeks.      Nutrition Monitoring & Evaluation: adherence to recommendations and patient stated goals    Need for follow-up: As scheduled for JAN Castellanos Shared Medical Appointment ([facility]). Follow-up with nutrition in 4-6 weeks.    Referred by: JAN Castellanos     MNT Billing Type: Medical Nutrition Assessment, each 15 min increment, for 3 increments.    SIGNATURE:   Anabela Carter MS, RD, LD                                                      DATE:   3/26/2025

## 2025-04-14 ENCOUNTER — PATIENT MESSAGE (OUTPATIENT)
Dept: PRIMARY CARE | Facility: CLINIC | Age: 45
End: 2025-04-14
Payer: COMMERCIAL

## 2025-04-15 ENCOUNTER — OFFICE VISIT (OUTPATIENT)
Dept: PRIMARY CARE | Facility: CLINIC | Age: 45
End: 2025-04-15
Payer: COMMERCIAL

## 2025-04-15 VITALS
SYSTOLIC BLOOD PRESSURE: 128 MMHG | DIASTOLIC BLOOD PRESSURE: 72 MMHG | OXYGEN SATURATION: 97 % | WEIGHT: 293 LBS | BODY MASS INDEX: 50.46 KG/M2 | HEART RATE: 89 BPM | TEMPERATURE: 96.2 F

## 2025-04-15 DIAGNOSIS — G89.29 CHRONIC LEFT-SIDED LOW BACK PAIN WITHOUT SCIATICA: Primary | ICD-10-CM

## 2025-04-15 DIAGNOSIS — L68.0 HIRSUTISM: ICD-10-CM

## 2025-04-15 DIAGNOSIS — M54.50 CHRONIC LEFT-SIDED LOW BACK PAIN WITHOUT SCIATICA: Primary | ICD-10-CM

## 2025-04-15 DIAGNOSIS — E11.9 TYPE 2 DIABETES MELLITUS WITHOUT COMPLICATION, WITHOUT LONG-TERM CURRENT USE OF INSULIN: ICD-10-CM

## 2025-04-15 DIAGNOSIS — F32.A DEPRESSION, UNSPECIFIED DEPRESSION TYPE: ICD-10-CM

## 2025-04-15 LAB — POC HEMOGLOBIN A1C: 6.5 % (ref 4.2–6.5)

## 2025-04-15 PROCEDURE — 83036 HEMOGLOBIN GLYCOSYLATED A1C: CPT | Performed by: INTERNAL MEDICINE

## 2025-04-15 PROCEDURE — 3044F HG A1C LEVEL LT 7.0%: CPT | Performed by: INTERNAL MEDICINE

## 2025-04-15 PROCEDURE — 99214 OFFICE O/P EST MOD 30 MIN: CPT | Performed by: INTERNAL MEDICINE

## 2025-04-15 PROCEDURE — 3074F SYST BP LT 130 MM HG: CPT | Performed by: INTERNAL MEDICINE

## 2025-04-15 PROCEDURE — 3078F DIAST BP <80 MM HG: CPT | Performed by: INTERNAL MEDICINE

## 2025-04-15 RX ORDER — FLUOXETINE HYDROCHLORIDE 20 MG/1
20 CAPSULE ORAL DAILY
Qty: 30 CAPSULE | Refills: 1 | Status: SHIPPED | OUTPATIENT
Start: 2025-04-15 | End: 2025-06-14

## 2025-04-15 RX ORDER — PREDNISONE 20 MG/1
60 TABLET ORAL DAILY
Qty: 15 TABLET | Refills: 0 | Status: SHIPPED | OUTPATIENT
Start: 2025-04-15 | End: 2025-04-20

## 2025-04-15 ASSESSMENT — ENCOUNTER SYMPTOMS
DYSURIA: 0
PARESTHESIAS: 0
WEAKNESS: 0
BOWEL INCONTINENCE: 0
PARESIS: 0
NUMBNESS: 0
TINGLING: 0
LEG PAIN: 0
FEVER: 0
HEADACHES: 0
BACK PAIN: 1
WEIGHT LOSS: 0
PERIANAL NUMBNESS: 0
ABDOMINAL PAIN: 0

## 2025-04-15 ASSESSMENT — PAIN SCALES - GENERAL: PAINLEVEL_OUTOF10: 6

## 2025-04-15 NOTE — PROGRESS NOTES
Subjective   Patient ID: Caity Pastrana is a 45 y.o. female who presents for medication (Also back pain).    This is a 45 y.o. with chronic left sided LBP and now has an acute flare up x 3 weeks. She states that she started exercising and it started hurting then. She has pain down her left leg. No incontinence or weakness.  Pt is frustrated that she is unable to lose weight. She eats healthy and tries to exercise. She is currently under the care of ENDO to try to help manage this. Pt has been pre-diabetic in the past and taken Victoza which didn't help.   Pt is depressed regarding her weight, appearance and finances. She also has family stressors. She denies suicidal ideation/intent.    Back Pain  This is a recurrent problem. The current episode started 1 to 4 weeks ago. The problem occurs constantly. The problem has been waxing and waning since onset. The pain is present in the lumbar spine and sacro-iliac. The quality of the pain is described as aching and burning. The pain is at a severity of 6/10. The pain is The same all the time. The symptoms are aggravated by bending, position, sitting, standing and twisting. Stiffness is present All day. Pertinent negatives include no abdominal pain, bladder incontinence, bowel incontinence, chest pain, dysuria, fever, headaches, leg pain, numbness, paresis, paresthesias, pelvic pain, perianal numbness, tingling, weakness or weight loss. Risk factors include menopause and obesity.        Review of Systems   Constitutional:  Negative for fever and weight loss.   Cardiovascular:  Negative for chest pain.   Gastrointestinal:  Negative for abdominal pain and bowel incontinence.   Genitourinary:  Negative for bladder incontinence, dysuria and pelvic pain.   Musculoskeletal:  Positive for back pain.   Neurological:  Negative for tingling, weakness, numbness, headaches and paresthesias.       Objective   /72   Pulse 89   Temp 35.7 °C (96.2 °F)   Wt 148 kg (327 lb)   SpO2  97%   BMI 50.46 kg/m²     Physical Exam  Vitals reviewed.   Constitutional:       General: She is not in acute distress.     Appearance: Normal appearance. She is obese. She is not ill-appearing.   Cardiovascular:      Rate and Rhythm: Normal rate and regular rhythm.      Heart sounds: Normal heart sounds.   Pulmonary:      Effort: Pulmonary effort is normal.      Breath sounds: Normal breath sounds.   Musculoskeletal:      Lumbar back: Tenderness present. No bony tenderness. Decreased range of motion.      Comments: Left lumbar paraspinal tenderness and left buttock tenderness   Skin:     Comments: Facial hair   Neurological:      Mental Status: She is alert.         Assessment/Plan   Diagnoses and all orders for this visit:  Chronic left-sided low back pain without sciatica  -     predniSONE (Deltasone) 20 mg tablet; Take 3 tablets (60 mg) by mouth once daily for 5 days.  Hirsutism  -     Testosterone, total and free; Future  -     DHEA-Sulfate; Future  Depression, unspecified depression type  -     FLUoxetine (PROzac) 20 mg capsule; Take 1 capsule (20 mg) by mouth once daily.  Type 2 diabetes mellitus without complication, without long-term current use of insulin  -     POCT glycosylated hemoglobin (Hb A1C) manually resulted  -     semaglutide 0.25 mg or 0.5 mg (2 mg/3 mL) pen injector; Inject 0.25 mg under the skin 1 (one) time per week.  Other orders  -     Follow Up In Primary Care - Established; Future

## 2025-04-17 LAB
DHEA-S SERPL-MCNC: 76 MCG/DL (ref 15–205)
TESTOST FREE SERPL-MCNC: NORMAL PG/ML
TESTOST SERPL-MCNC: NORMAL NG/DL

## 2025-04-21 LAB
DHEA-S SERPL-MCNC: 76 MCG/DL (ref 15–205)
TESTOST FREE SERPL-MCNC: 2.2 PG/ML (ref 0.1–6.4)
TESTOST SERPL-MCNC: 10 NG/DL (ref 2–45)

## 2025-04-25 ENCOUNTER — OFFICE VISIT (OUTPATIENT)
Dept: PRIMARY CARE | Facility: CLINIC | Age: 45
End: 2025-04-25
Payer: COMMERCIAL

## 2025-04-25 ENCOUNTER — TELEPHONE (OUTPATIENT)
Dept: PRIMARY CARE | Facility: CLINIC | Age: 45
End: 2025-04-25

## 2025-04-25 VITALS
SYSTOLIC BLOOD PRESSURE: 128 MMHG | BODY MASS INDEX: 49.84 KG/M2 | OXYGEN SATURATION: 97 % | TEMPERATURE: 97.2 F | DIASTOLIC BLOOD PRESSURE: 80 MMHG | HEART RATE: 89 BPM | WEIGHT: 293 LBS

## 2025-04-25 DIAGNOSIS — Z12.11 COLON CANCER SCREENING: ICD-10-CM

## 2025-04-25 DIAGNOSIS — Z00.00 ADULT GENERAL MEDICAL EXAM: Primary | ICD-10-CM

## 2025-04-25 DIAGNOSIS — E11.9 TYPE 2 DIABETES MELLITUS WITHOUT COMPLICATION, WITHOUT LONG-TERM CURRENT USE OF INSULIN: ICD-10-CM

## 2025-04-25 LAB
CHOLEST SERPL-MCNC: 168 MG/DL
CHOLEST/HDLC SERPL: 3.7 (CALC)
HDLC SERPL-MCNC: 45 MG/DL
LDLC SERPL CALC-MCNC: 101 MG/DL (CALC)
NONHDLC SERPL-MCNC: 123 MG/DL (CALC)
TRIGL SERPL-MCNC: 119 MG/DL

## 2025-04-25 PROCEDURE — 3044F HG A1C LEVEL LT 7.0%: CPT | Performed by: INTERNAL MEDICINE

## 2025-04-25 PROCEDURE — 99396 PREV VISIT EST AGE 40-64: CPT | Performed by: INTERNAL MEDICINE

## 2025-04-25 PROCEDURE — 3079F DIAST BP 80-89 MM HG: CPT | Performed by: INTERNAL MEDICINE

## 2025-04-25 PROCEDURE — 3074F SYST BP LT 130 MM HG: CPT | Performed by: INTERNAL MEDICINE

## 2025-04-25 PROCEDURE — 1036F TOBACCO NON-USER: CPT | Performed by: INTERNAL MEDICINE

## 2025-04-25 RX ORDER — TIRZEPATIDE 2.5 MG/.5ML
2.5 INJECTION, SOLUTION SUBCUTANEOUS WEEKLY
Qty: 2 ML | Refills: 1 | Status: SHIPPED | OUTPATIENT
Start: 2025-04-25

## 2025-04-25 ASSESSMENT — PROMIS GLOBAL HEALTH SCALE
RATE_AVERAGE_PAIN: 3
RATE_MENTAL_HEALTH: FAIR
RATE_PHYSICAL_HEALTH: POOR
RATE_SOCIAL_SATISFACTION: VERY GOOD
RATE_GENERAL_HEALTH: FAIR
RATE_QUALITY_OF_LIFE: GOOD
EMOTIONAL_PROBLEMS: RARELY
CARRYOUT_PHYSICAL_ACTIVITIES: COMPLETELY
CARRYOUT_SOCIAL_ACTIVITIES: VERY GOOD
RATE_AVERAGE_FATIGUE: MODERATE

## 2025-04-25 ASSESSMENT — PATIENT HEALTH QUESTIONNAIRE - PHQ9
SUM OF ALL RESPONSES TO PHQ9 QUESTIONS 1 AND 2: 0
1. LITTLE INTEREST OR PLEASURE IN DOING THINGS: NOT AT ALL
2. FEELING DOWN, DEPRESSED OR HOPELESS: NOT AT ALL

## 2025-04-25 ASSESSMENT — PAIN SCALES - GENERAL: PAINLEVEL_OUTOF10: 0-NO PAIN

## 2025-04-25 NOTE — PROGRESS NOTES
Subjective   Patient ID: Caity Pastrana is a 45 y.o. female who presents for Annual Exam.    HPI  1.  Physical exam.  Pt is newly diabetic. Her insurance would not cover Ozempic, so a PA will be performed for Mounjaro.  Pt is feeling well and has no concerns today.  Pap: last done 2/2024  Mammogram: last done 7/2024  Colonoscopy: due  Immunizations: UTD    Review of Systems  All pertinent positive symptoms are included in the history of present illness.    All other systems have been reviewed and are negative and noncontributory to this patient's current ailments.    Past Medical History:   Diagnosis Date    Allergic rhinitis     CTS (carpal tunnel syndrome)     Depression     Dizziness     GERD (gastroesophageal reflux disease)     Migraine     Personal history of other diseases of the digestive system     History of esophageal reflux    Personal history of other diseases of urinary system     History of bladder problems    Personal history of other mental and behavioral disorders     History of depression    Sleep difficulties     Urinary tract infection      Past Surgical History:   Procedure Laterality Date    BREAST RECONSTRUCTION Bilateral     breast reduction in 2005    CHOLECYSTECTOMY  01/06/2014    Cholecystectomy    COLPOSCOPY      PLANTAR FASCIA SURGERY Right 2013    TUBAL LIGATION  01/06/2014    Tubal Ligation     Social History     Tobacco Use    Smoking status: Never     Passive exposure: Past    Smokeless tobacco: Never   Vaping Use    Vaping status: Never Used   Substance Use Topics    Alcohol use: Yes     Alcohol/week: 1.0 standard drink of alcohol     Types: 1 Glasses of wine per week     Comment: Social drinker    Drug use: Never     Family History   Problem Relation Name Age of Onset    Arthritis Mother Nazia Pastrana     Breast cancer Mother Nazia Pastrana     Other (Ovarian cancer) Mother Nazia Pastrana     Diabetes Mother Nazia Pastrana     Mental illness Mother Nazia Pastrana     Ovarian cancer Mother  Nazia Pastrana     Cancer Mother Nazia Pastrana      Allergies   Allergen Reactions    Amoxicillin-Pot Clavulanate GI Upset and Headache    Doxycycline Monohydrate Dizziness, GI Upset and Headache    Metformin Blurred vision and Headache    Nickel Itching    Oxycodone-Acetaminophen Nausea/vomiting    Sulfa (Sulfonamide Antibiotics) Unknown    Tramadol Unknown    Bee Pollen Rash    Levofloxacin Dizziness, GI Upset, Headache, Other and Palpitations    Naproxen Rash     Immunization History   Administered Date(s) Administered    Flu vaccine (IIV4), preservative free *Check age/dose* 01/08/2021    Influenza, Unspecified 10/02/2012    Influenza, injectable, quadrivalent 10/28/2016    Influenza, seasonal, injectable 10/05/2015    Moderna SARS-CoV-2 Vaccination 04/09/2021, 05/06/2021    Tdap vaccine, age 7 year and older (BOOSTRIX, ADACEL) 10/04/2021     Current Outpatient Medications   Medication Instructions    azelastine (Astelin) 137 mcg (0.1 %) nasal spray 1 spray, Each Nostril, 2 times daily PRN, Use in each nostril as directed    busPIRone (BUSPAR) 7.5 mg, oral, Daily    cetirizine (ZYRTEC) 10 mg, oral, Daily PRN    cholecalciferol (VITAMIN D-3) 50,000 Units, oral, Weekly    cyclobenzaprine (FLEXERIL) 10 mg, oral, Nightly PRN    ergocalciferol (Vitamin D-2) 1.25 MG (95694 UT) capsule 1 capsule, Every 7 days    FLUoxetine (PROZAC) 20 mg, oral, Daily    fluticasone (Flonase) 50 mcg/actuation nasal spray 1 spray, Each Nostril, 2 times daily, Shake gently. Before first use, prime pump. After use, clean tip and replace cap.    meloxicam (MOBIC) 15 mg, oral, Daily    Mounjaro 2.5 mg, subcutaneous, Weekly    omeprazole (PRILOSEC) 40 mg, oral, Daily before breakfast, Do not crush or chew.    sod chloride-sodium bicarb (Neilmed Sinus Rinse Complete) nasal rinse Irrigate your nose per instructions twice daily    triamcinolone (Nasacort) 55 mcg nasal inhaler 2 sprays, Each Nostril, Daily    zolpidem (AMBIEN) 10 mg, oral, Nightly PRN      Objective   Visit Vitals  /80   Pulse 89   Temp 36.2 °C (97.2 °F)   Wt 147 kg (323 lb)   SpO2 97%   BMI 49.84 kg/m²   OB Status Postmenopausal   Smoking Status Never   BSA 2.65 m²     Office Visit on 04/15/2025   Component Date Value    POC HEMOGLOBIN A1c 04/15/2025 6.5     DHEA SULFATE 04/16/2025 76     TESTOSTERONE, TOTAL, MS 04/16/2025 10     TESTOSTERONE, FREE 04/16/2025 2.2      Physical Exam  CONSTITUTIONAL - well nourished, well developed, looks like stated age, in no acute distress, not ill-appearing, and not tired appearing  SKIN - normal skin color and pigmentation, normal skin turgor without rash, lesions, or nodules visualized  HEAD - no trauma, normocephalic  EYES - pupils are equal and reactive to light, extraocular muscles are intact, and normal external exam  ENT - TM's intact, no injection, no signs of infection, uvula midline, normal tongue movement and throat normal, no exudate, nasal passage without discharge and patent  NECK - supple without rigidity, no neck mass was observed, no thyromegaly or thyroid nodules  CHEST - clear to auscultation, no wheezing, no crackles and no rales, good effort  CARDIAC - regular rate and regular rhythm, no skipped beats, no murmur  ABDOMEN - no organomegaly, soft, nontender, nondistended, normal bowel sounds, no guarding/rebound/rigidity, negative McBurney sign and negative Hoffman sign  EXTREMITIES - no edema, no deformities  NEUROLOGICAL - normal gait, normal balance, normal motor, no ataxia; alert, oriented and no focal signs  PSYCHIATRIC - alert, pleasant and cordial, age-appropriate  IMMUNOLOGIC - no cervical lymphadenopathy    Assessment/Plan   Problem List Items Addressed This Visit    None  Visit Diagnoses         Adult general medical exam    -  Primary    Relevant Orders    Lipid Panel      Type 2 diabetes mellitus without complication, without long-term current use of insulin        Relevant Medications    tirzepatide (Mounjaro) 2.5 mg/0.5 mL  pen injector    Other Relevant Orders    Albumin-Creatinine Ratio, Urine Random      Colon cancer screening        Relevant Orders    Colonoscopy Screening; Average Risk Patient

## 2025-04-27 NOTE — PROGRESS NOTES
Endocrinology  04/29/25     History of Present Illness   Caity Pastrana is a 45 y.o. year old female with medical history of class 3 obesity (BMI 49) following w/ weight loss clinic, empty sella syndrome, newly dx diabetes here for pituitary evaluation.      She has a PMH of empty sella syndrome that was found on incidental finding. She denies every being told she has empty sella syndrome. Around the age of 37 she developed uncontrolled weight gain (249 lbs at the time). Prior to that time she had menstrual cycles that were irregular in that they were only  by 1-4 weeks and bleeding could last 3 days to 4 weeks. At age 37-38 her menses became more sporadic and by age 38-39 they stopped entirely. She denies beng on any hormonal birth control because it gave her migraines. She stopped this around age 42. Around the time of carlyle pause she developed hirsuitism of the neck and chin. Around age 40 she paid for a year of laser hair removal and it didn't work. She now shaves every other day.     She has never been on spironolactone for her hirsutism.     She was previously prescribed ozempic but never started it. Now trying to start mounjaro.     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 She stopped taking prednisone 3 days ago (60 mg for 4 days). Denies recent joint injections. Denies any inhaled steroids.     Review of Systems   10 pt ROS negative other than mentioned in HPI     Medications     Current Outpatient Medications   Medication Instructions    azelastine (Astelin) 137 mcg (0.1 %) nasal spray 1 spray, Each Nostril, 2 times daily PRN, Use in each nostril as directed    busPIRone (BUSPAR) 7.5 mg, oral, Daily    cetirizine (ZYRTEC) 10 mg, oral, Daily PRN    cholecalciferol (VITAMIN D-3) 50,000 Units, oral, Weekly    cyclobenzaprine (FLEXERIL) 10 mg, oral, Nightly PRN    ergocalciferol (Vitamin D-2) 1.25 MG (57732 UT) capsule 1 capsule,  "Every 7 days    FLUoxetine (PROZAC) 20 mg, oral, Daily    fluticasone (Flonase) 50 mcg/actuation nasal spray 1 spray, Each Nostril, 2 times daily, Shake gently. Before first use, prime pump. After use, clean tip and replace cap.    meloxicam (MOBIC) 15 mg, oral, Daily    Mounjaro 2.5 mg, subcutaneous, Weekly    omeprazole (PRILOSEC) 40 mg, oral, Daily before breakfast, Do not crush or chew.    sod chloride-sodium bicarb (Neilmed Sinus Rinse Complete) nasal rinse Irrigate your nose per instructions twice daily    triamcinolone (Nasacort) 55 mcg nasal inhaler 2 sprays, Each Nostril, Daily    zolpidem (AMBIEN) 10 mg, oral, Nightly PRN          History   Medical History[1]    Surgical History[2]    Family History[3]     RX Allergies[4]       Physical Exam   BP (!) 124/100   Pulse 89   Ht 1.715 m (5' 7.5\")   Wt 146 kg (320 lb 15.8 oz)   BMI 49.53 kg/m²    Constitutional: She is well-developed, well-nourished, and in no distress.  No hyperactivity observed, no rapid speech.   Head: Normocephalic, Atrautmatic  Mouth/Throat: Oropharynx is clear and moist  Eyes: No lid retraction (stare), no proptosis, no lid lag. Non-icteric.   Cardiovascular: Normal rate, regular rhythm, normal heart sounds  Pulmonary/Chest: Effort normal and breath sounds normal. No respiratory distress.  Neurological: She is alert. She is not disoriented.   Skin: Skin is warm and moist. No pre tibial edema.   Psychiatric: Appropriate mood and affect        Labs and Imaging      Latest Reference Range & Units 03/17/25 08:24 04/16/25 12:58   FSH mIU/mL 21.0    LH mIU/mL 8.4    PROLACTIN ng/mL <1.0 (L)    TSH mIU/L 1.53    GROWTH HORMONE (GH) <=7.1 ng/mL 0.3    DHEA SULFATE 15 - 205 mcg/dL  76   CORTISOL, A.M. mcg/dL 20.5    TESTOSTERONE, TOTAL, MS 2 - 45 ng/dL  10   TESTOSTERONE, FREE 0.1 - 6.4 pg/mL  2.2   ACTH, PLASMA 6 - 50 pg/mL 29    (L): Data is abnormally low    MRI brain 2018  IMPRESSION:  1. Partially empty sella which is anatomic variant  2. " Mild mucosal thickening of multiple sinuses as described  3. Otherwise normal study.    MRI pituitary 2025  IMPRESSION:  Mildly expanded partial empty sella. The pituitary gland is small  with potential 1 mm diameter hypoenhancing focus along the inferior  leftward aspect of the gland. Recommend correlation with laboratory  values/prolactin level.    Assessment and Plan   Caity Pastrana is a 45 y.o. year old female with medical history of class 3 obesity (BMI 49) following w/ weight loss clinic, empty sella syndrome, here for pituitary evaluation.     #Empty sella syndrome  - Seen by NSGY 3/2025 who have reviewed imaging and ruled out adenoma.   - No overtly abnormal hormonal production based on 3/2025 labs  - Pituitary panel assessed and of note cortisol is borderline elevated. Will perform LDDST given amenorrhea, uncontrolled weight gain, and new onset diabetes.       #Amenorrhea  #Hirsutism  - Periods irregular up until age 37 and then became intermittent. Cessation of menses around age 39. If true menopause this is concerning for primary ovarian insufficiency. This may also be due to hypercortisolemia, and we will rule out. Patient has been seen by GYN, if not hypercortisolemia will reach out to them.   - While no longer menstruating, patient may have previously met [place] criteria with PCOS with clinical hyperandrogenism + irregular periods.   - Androgens assessed in 4/2025 and were WNL. We discussed that they can still act on hair follicles even when values are WNL, and cause hirsutism. She declines spironolactone at this time.   - If no hypercortisolemia will provide derm referral for hirsutism.       RTC: 3M if needed       Lachelle Sheth MD   of Medicine  Department of Medicine  [facility]  [facility]                               [1]   Past Medical History:  Diagnosis Date    Allergic rhinitis     CTS (carpal tunnel syndrome)      Depression     Dizziness     GERD (gastroesophageal reflux disease)     Migraine     Personal history of other diseases of the digestive system     History of esophageal reflux    Personal history of other diseases of urinary system     History of bladder problems    Personal history of other mental and behavioral disorders     History of depression    Sleep difficulties     Urinary tract infection    [2]   Past Surgical History:  Procedure Laterality Date    BREAST RECONSTRUCTION Bilateral     breast reduction in 2005    CHOLECYSTECTOMY  01/06/2014    Cholecystectomy    COLPOSCOPY      PLANTAR FASCIA SURGERY Right 2013    TUBAL LIGATION  01/06/2014    Tubal Ligation   [3]   Family History  Problem Relation Name Age of Onset    Arthritis Mother Nazia Pastrana     Breast cancer Mother Nazia Pastrana     Other (Ovarian cancer) Mother Nazia Pastrana     Diabetes Mother Nazia Pastrana     Mental illness Mother Nazia Pastrana     Ovarian cancer Mother Nazia Pastrana     Cancer Mother Nazia Pastrana    [4]   Allergies  Allergen Reactions    Amoxicillin-Pot Clavulanate GI Upset and Headache    Doxycycline Monohydrate Dizziness, GI Upset and Headache    Metformin Blurred vision and Headache    Nickel Itching    Oxycodone-Acetaminophen Nausea/vomiting    Sulfa (Sulfonamide Antibiotics) Unknown    Tramadol Unknown    Bee Pollen Rash    Levofloxacin Dizziness, GI Upset, Headache, Other and Palpitations    Naproxen Rash

## 2025-04-28 ENCOUNTER — PATIENT MESSAGE (OUTPATIENT)
Dept: PRIMARY CARE | Facility: CLINIC | Age: 45
End: 2025-04-28
Payer: COMMERCIAL

## 2025-04-28 DIAGNOSIS — E11.9 TYPE 2 DIABETES MELLITUS WITHOUT COMPLICATION, WITHOUT LONG-TERM CURRENT USE OF INSULIN: ICD-10-CM

## 2025-04-28 RX ORDER — TIRZEPATIDE 2.5 MG/.5ML
2.5 INJECTION, SOLUTION SUBCUTANEOUS WEEKLY
Qty: 2 ML | Refills: 1 | Status: SHIPPED | OUTPATIENT
Start: 2025-04-28 | End: 2025-04-29

## 2025-04-29 ENCOUNTER — OFFICE VISIT (OUTPATIENT)
Age: 45
End: 2025-04-29
Payer: COMMERCIAL

## 2025-04-29 VITALS
SYSTOLIC BLOOD PRESSURE: 124 MMHG | HEART RATE: 89 BPM | DIASTOLIC BLOOD PRESSURE: 100 MMHG | BODY MASS INDEX: 44.41 KG/M2 | HEIGHT: 68 IN | WEIGHT: 293 LBS

## 2025-04-29 DIAGNOSIS — E66.01 MORBID OBESITY WITH BMI OF 45.0-49.9, ADULT (MULTI): ICD-10-CM

## 2025-04-29 DIAGNOSIS — E11.9 TYPE 2 DIABETES MELLITUS WITHOUT COMPLICATION, WITHOUT LONG-TERM CURRENT USE OF INSULIN: ICD-10-CM

## 2025-04-29 DIAGNOSIS — N91.2 AMENORRHEA: Primary | ICD-10-CM

## 2025-04-29 PROCEDURE — 3044F HG A1C LEVEL LT 7.0%: CPT | Performed by: STUDENT IN AN ORGANIZED HEALTH CARE EDUCATION/TRAINING PROGRAM

## 2025-04-29 PROCEDURE — 3080F DIAST BP >= 90 MM HG: CPT | Performed by: STUDENT IN AN ORGANIZED HEALTH CARE EDUCATION/TRAINING PROGRAM

## 2025-04-29 PROCEDURE — 99214 OFFICE O/P EST MOD 30 MIN: CPT | Performed by: STUDENT IN AN ORGANIZED HEALTH CARE EDUCATION/TRAINING PROGRAM

## 2025-04-29 PROCEDURE — 3008F BODY MASS INDEX DOCD: CPT | Performed by: STUDENT IN AN ORGANIZED HEALTH CARE EDUCATION/TRAINING PROGRAM

## 2025-04-29 PROCEDURE — 3074F SYST BP LT 130 MM HG: CPT | Performed by: STUDENT IN AN ORGANIZED HEALTH CARE EDUCATION/TRAINING PROGRAM

## 2025-04-29 RX ORDER — DEXAMETHASONE 1 MG/1
1 TABLET ORAL ONCE
Qty: 1 TABLET | Refills: 0 | Status: SHIPPED | OUTPATIENT
Start: 2025-04-29 | End: 2025-04-29

## 2025-04-29 RX ORDER — TIRZEPATIDE 2.5 MG/.5ML
2.5 INJECTION, SOLUTION SUBCUTANEOUS WEEKLY
Qty: 2 ML | Refills: 1 | Status: SHIPPED | OUTPATIENT
Start: 2025-04-29

## 2025-04-29 ASSESSMENT — PATIENT HEALTH QUESTIONNAIRE - PHQ9
2. FEELING DOWN, DEPRESSED OR HOPELESS: NOT AT ALL
1. LITTLE INTEREST OR PLEASURE IN DOING THINGS: NOT AT ALL
SUM OF ALL RESPONSES TO PHQ9 QUESTIONS 1 AND 2: 0

## 2025-04-29 ASSESSMENT — ENCOUNTER SYMPTOMS
DEPRESSION: 0
OCCASIONAL FEELINGS OF UNSTEADINESS: 0
LOSS OF SENSATION IN FEET: 0

## 2025-04-29 NOTE — PATIENT INSTRUCTIONS
After Visit Summary  It was great seeing you today!    In summary from our visit today, I would like to remind you of the following:    - please get labs done.   - for dexamethasone suppression test: take dexamethasone at 11PM the night before you plan to go to the lab. Go to lab the following morning at 8AM and ask lab to draw the cortisol   - please get fsh, lh, estrogen, and 17-hydroxyprogesterone drawn separately from the cortisol      Division of Endocrinology   Follow-up appointments:  Please arrive at least 20 minutes prior to your follow up appointments in order to keep visits as close as possible to the scheduled times. If you need to cancel an appointment, please call at least 24 hours in advance.    Please bring your medications or an updated list of medications to each of your visits to help ensure safety in prescribing future medications.    If you are being seen for diabetes, please bring your glucose meter and/or glucose log with 2 weeks of glucose readings to each visit.    Medication Refills: Please request medication refills at the time of your appointment.   - Refills requested by phone or PalindromXhart in between visits require at least 3 business days to be processed.    Lab Results: Please allow at least 7 business days for lab results to be reviewed.  - Please note, that some specialty testing may take up to at least 7 business to be completed.   - If there are any urgent issues requiring immediate attention, we will contact you at your provided phone numbers.   - Any non-urgent results will be relayed via [employer] if you have signed up for this service or via a letter through the mail and/or phone call.     Communication with your provider:  - [facility] is highly recommended as the most efficient means to contact your provider. However for urgent concerns please call.   - For phone calls, please call our office Monday- Friday 8am to 4:30pm and your message will be relayed to your  provider. Please allows 1-2 business days for a response.  - After hours messages are left with an answering service and will be handled by the clinic the following business day.      Sincerely,   Lachelle Sheth MD  Division of Endocrinology  [facility]    Finasteride Male Counseling: Finasteride Counseling:  I discussed with the patient the risks of use of finasteride including but not limited to decreased libido, decreased ejaculate volume, gynecomastia, and depression. Women should not handle medication.  All of the patient's questions and concerns were addressed. Finasteride Counseling:  I discussed with the patient the risks of use of finasteride including but not limited to decreased libido, decreased ejaculate volume, gynecomastia, and depression. Women should not handle medication.  All of the patient's questions and concerns were addressed.

## 2025-05-07 LAB — CORTIS AM PEAK SERPL-MCNC: 1.1 MCG/DL

## 2025-05-08 DIAGNOSIS — Z12.11 COLON CANCER SCREENING: Primary | ICD-10-CM

## 2025-05-08 RX ORDER — POLYETHYLENE GLYCOL 3350, SODIUM CHLORIDE, SODIUM BICARBONATE, POTASSIUM CHLORIDE 420; 11.2; 5.72; 1.48 G/4L; G/4L; G/4L; G/4L
4000 POWDER, FOR SOLUTION ORAL ONCE
Qty: 4000 ML | Refills: 0 | Status: SHIPPED | OUTPATIENT
Start: 2025-05-08 | End: 2025-05-08

## 2025-05-09 LAB
17OHP SERPL-MCNC: NORMAL NG/DL
ESTRADIOL SERPL-MCNC: 24 PG/ML
FSH SERPL-ACNC: 22.7 MIU/ML
LH SERPL-ACNC: 8.9 MIU/ML
TESTOST FREE SERPL-MCNC: 2.3 PG/ML (ref 0.1–6.4)
TESTOST SERPL-MCNC: 9 NG/DL (ref 2–45)

## 2025-05-16 ENCOUNTER — PHARMACY VISIT (OUTPATIENT)
Dept: PHARMACY | Facility: CLINIC | Age: 45
End: 2025-05-16
Payer: COMMERCIAL

## 2025-05-16 PROCEDURE — RXMED WILLOW AMBULATORY MEDICATION CHARGE

## 2025-05-19 LAB
17OHP SERPL-MCNC: 12 NG/DL
ESTRADIOL SERPL-MCNC: 24 PG/ML
FSH SERPL-ACNC: 22.7 MIU/ML
LH SERPL-ACNC: 8.9 MIU/ML
TESTOST FREE SERPL-MCNC: 2.3 PG/ML (ref 0.1–6.4)
TESTOST SERPL-MCNC: 9 NG/DL (ref 2–45)

## 2025-05-21 ENCOUNTER — PATIENT MESSAGE (OUTPATIENT)
Age: 45
End: 2025-05-21
Payer: COMMERCIAL

## 2025-05-21 DIAGNOSIS — E23.6 EMPTY SELLA SYNDROME (MULTI): Primary | ICD-10-CM

## 2025-05-21 DIAGNOSIS — L68.0 HIRSUTISM: ICD-10-CM

## 2025-06-10 PROCEDURE — RXMED WILLOW AMBULATORY MEDICATION CHARGE

## 2025-06-12 ENCOUNTER — PHARMACY VISIT (OUTPATIENT)
Dept: PHARMACY | Facility: CLINIC | Age: 45
End: 2025-06-12
Payer: COMMERCIAL

## 2025-06-20 ENCOUNTER — OFFICE VISIT (OUTPATIENT)
Dept: URGENT CARE | Age: 45
End: 2025-06-20
Payer: COMMERCIAL

## 2025-06-20 VITALS
HEIGHT: 68 IN | DIASTOLIC BLOOD PRESSURE: 54 MMHG | BODY MASS INDEX: 44.41 KG/M2 | WEIGHT: 293 LBS | SYSTOLIC BLOOD PRESSURE: 123 MMHG | RESPIRATION RATE: 16 BRPM | OXYGEN SATURATION: 97 % | HEART RATE: 87 BPM | TEMPERATURE: 98 F

## 2025-06-20 DIAGNOSIS — S61.213A LACERATION OF LEFT MIDDLE FINGER WITHOUT FOREIGN BODY WITHOUT DAMAGE TO NAIL, INITIAL ENCOUNTER: Primary | ICD-10-CM

## 2025-06-20 ASSESSMENT — PAIN SCALES - GENERAL: PAINLEVEL_OUTOF10: 7

## 2025-06-20 NOTE — PROGRESS NOTES
"Subjective   Patient ID: Caity Pastrana is a 45 y.o. female. They present today with a chief complaint of Finger Laceration (Cut left middle finger with hedge trimmers today).    History of Present Illness  See MDM    Past Medical History  Allergies as of 06/20/2025 - Reviewed 06/20/2025   Allergen Reaction Noted    Amoxicillin-pot clavulanate GI Upset and Headache 05/16/2024    Doxycycline monohydrate Dizziness, GI Upset, and Headache 05/16/2024    Metformin Blurred vision and Headache 02/28/2024    Nickel Itching 06/14/2013    Oxycodone-acetaminophen Nausea/vomiting 12/26/2024    Penicillins Unknown 06/20/2025    Quinolones Unknown 06/20/2025    Sulfa (sulfonamide antibiotics) Unknown 02/28/2024    Tramadol Unknown 02/28/2024    Bee pollen Rash 02/28/2024    Levofloxacin Dizziness, GI Upset, Headache, Other, and Palpitations 05/16/2024    Naproxen Rash 12/26/2024       Prescriptions Prior to Admission[1]     Medical History[2]    Surgical History[3]     reports that she has never smoked. She has been exposed to tobacco smoke. She has never used smokeless tobacco. She reports current alcohol use of about 1.0 standard drink of alcohol per week. She reports that she does not use drugs.    Review of Systems  ROS is negative unless otherwise stated in HPI.         Objective    Vitals:    06/20/25 1155   BP: 123/54   BP Location: Right arm   Patient Position: Sitting   Pulse: 87   Resp: 16   Temp: 36.7 °C (98 °F)   TempSrc: Oral   SpO2: 97%   Weight: 142 kg (313 lb 14.4 oz)   Height: 1.715 m (5' 7.5\")     No LMP recorded. Patient is postmenopausal.      VS: As documented in the triage note and EMR flowsheet from this visit was reviewed  General: Well appearing. No acute distress.   Eyes:  Extraocular movements grossly intact. No scleral icterus.   Head: Atraumatic. Normocephalic.     Neck: No meningismus. No gross masses. Full movement through range of motion  CV: Regular rhythm. No murmurs, rubs, gallops appreciated. "   Resp: Clear to auscultation bilaterally. No respiratory distress.    MSK: Symmetric muscle bulk. No gross step offs or deformities.   Skin: Warm, dry. No rashes.  3 lacerations to the pad of the distal left middle digit.  Most proximal of the lacerations is very superficial.  The more 2 distal lacerations are deep and actively bleeding.  Neuro: CN II-VII intact. A&O x3. Speech fluent. Alert. Moving all extremities. Ambulates with normal gait  Psych: Appropriate mood and affect for situation    Laceration Repair    Date/Time: 6/20/2025 4:02 PM    Performed by: Meghan De La Vega PA-C  Authorized by: Meghan De La Vega PA-C    Consent:     Consent obtained:  Verbal    Consent given by:  Patient    Risks, benefits, and alternatives were discussed: yes      Risks discussed:  Poor cosmetic result, pain and infection  Universal protocol:     Patient identity confirmed:  Verbally with patient  Anesthesia:     Anesthesia method:  Nerve block    Block needle gauge:  27 G    Block anesthetic:  Lidocaine 1% w/o epi    Block injection procedure:  Anatomic landmarks identified, introduced needle and incremental injection  Laceration details:     Location:  Finger    Finger location:  L long finger  Treatment:     Area cleansed with:  Shur-Clens    Amount of cleaning:  Standard  Skin repair:     Repair method:  Sutures    Suture size:  5-0    Wound skin closure material used: Ethilon.    Number of sutures: More proximal lac: 4, Distal lac: 2.  Approximation:     Approximation:  Close  Repair type:     Repair type:  Simple  Post-procedure details:     Dressing:  Bulky dressing    Procedure completion:  Tolerated      Point of Care Test & Imaging Results from this visit  No results found for this visit on 06/20/25.   Imaging  No results found.    Cardiology, Vascular, and Other Imaging  No other imaging results found for the past 2 days      Diagnostic study results (if any) were reviewed by Meghan De La Vega PA-C.    Assessment/Plan    Allergies, medications, history, and pertinent labs/EKGs/Imaging reviewed by Meghan De La Vega PA-C.     Medical Decision Making  Patient is a 45-year-old female who presents with left middle digit lacerations while using a  earlier this morning.  On examination she has 3 lacerations to the pad of the left third digit.  Tetanus is up-to-date.  The more proximal laceration was very superficial and easily repaired with Dermabond.  The other 2 lacerations were repaired with sutures as noted above.  Advised have sutures removed in 7 to 10 days.  Advised on wound care.  Advised to return for any signs or symptoms of infection.    Orders and Diagnoses  Diagnoses and all orders for this visit:  Laceration of left middle finger without foreign body without damage to nail, initial encounter  Other orders  -     Laceration Repair      Medical Admin Record      Patient disposition: Home    Electronically signed by Meghan De La Vega PA-C  4:06 PM           [1] (Not in a hospital admission)   [2]   Past Medical History:  Diagnosis Date    Allergic rhinitis     CTS (carpal tunnel syndrome)     Depression     Dizziness     GERD (gastroesophageal reflux disease)     Migraine     Personal history of other diseases of the digestive system     History of esophageal reflux    Personal history of other diseases of urinary system     History of bladder problems    Personal history of other mental and behavioral disorders     History of depression    Sleep difficulties     Urinary tract infection    [3]   Past Surgical History:  Procedure Laterality Date    BREAST RECONSTRUCTION Bilateral     breast reduction in 2005    CHOLECYSTECTOMY  01/06/2014    Cholecystectomy    COLPOSCOPY      PLANTAR FASCIA SURGERY Right 2013    TUBAL LIGATION  01/06/2014    Tubal Ligation

## 2025-06-23 ENCOUNTER — APPOINTMENT (OUTPATIENT)
Dept: ENDOCRINOLOGY | Facility: CLINIC | Age: 45
End: 2025-06-23
Payer: COMMERCIAL

## 2025-06-23 VITALS — HEIGHT: 68 IN | BODY MASS INDEX: 44.41 KG/M2 | WEIGHT: 293 LBS

## 2025-06-23 DIAGNOSIS — R63.5 WEIGHT GAIN: ICD-10-CM

## 2025-06-23 DIAGNOSIS — E11.65 TYPE 2 DIABETES MELLITUS WITH HYPERGLYCEMIA, WITHOUT LONG-TERM CURRENT USE OF INSULIN: ICD-10-CM

## 2025-06-23 DIAGNOSIS — E66.813 CLASS 3 SEVERE OBESITY WITHOUT SERIOUS COMORBIDITY WITH BODY MASS INDEX (BMI) OF 45.0 TO 49.9 IN ADULT, UNSPECIFIED OBESITY TYPE: Primary | ICD-10-CM

## 2025-06-23 PROCEDURE — 3008F BODY MASS INDEX DOCD: CPT | Performed by: NURSE PRACTITIONER

## 2025-06-23 PROCEDURE — 3044F HG A1C LEVEL LT 7.0%: CPT | Performed by: NURSE PRACTITIONER

## 2025-06-23 PROCEDURE — 99215 OFFICE O/P EST HI 40 MIN: CPT | Performed by: NURSE PRACTITIONER

## 2025-06-23 ASSESSMENT — ENCOUNTER SYMPTOMS
LOSS OF SENSATION IN FEET: 0
OCCASIONAL FEELINGS OF UNSTEADINESS: 0
DEPRESSION: 1

## 2025-06-23 NOTE — PROGRESS NOTES
Subjective  Caity Pastrana is a 45 y.o. female with a hx of obesity  who presents for weight management and obesity medicine follow up.  PMH: Obesity, diabetes 2  Current Plan  1. Nutrition: Mediterranean Diet and Healthy Plate   challenges with getting in the protein, limited to chicken for meats   2. Sleep: Stable      3. Stress: Stable     4. Exercise: Incorporating inconsistently-        5. Appetite control: Increased  Obesity medication: Mounjaro- 2.5  - NO SIDE EFFECTS     6. Prior Goals: Met  Nutrition: Dietitian consult. -met  Physical exercise: 3 -4 days of exercise for stress release, doing classes at the [facility], or do videos at home YouTube: [employer], [employer], do some walking  Medications: see below for information on medical weight loss medications. Call your insurance plan to see the weight management medications they cover. The Victoza, Wegovy, or Zepbound are ideal options with Metabolic Disease  Follow up: Follow up with lab work from neurosurgery, dietitians visit and then the group visit. Referred to Dr. Lachelle Sheth endocrinologist regarding further work up and scheduled     New Goals: Nutrition- Healthy Plate, Exercise- weight and/or resistance 30 minutes 4 times per week, Medication- increase Mounjaro to 5.0, and Follow-up- 1 MONTH    Weight trend:    Wt Readings from Last 3 Encounters:   06/20/25 142 kg (313 lb 14.4 oz)   04/29/25 146 kg (320 lb 15.8 oz)   04/25/25 147 kg (323 lb)     Lab Results   Component Value Date    HGBA1C 6.5 04/15/2025       Recent weight:    Current Medications[1]    ROS:  System: normal  Eyes : no visual changes  Neck : no tenderness, no new lumps/bumps  Respiratory : no SOB  Cardiovascular : no chest pain, no palpitations  Gastro-Intestinal : no abdominal concerns  Neurological : no numbness or tingling in the extremities  Musculoskeletal : no joint paint, no muscle pain  Skin : no unusual rashes  Psychiatric : no depression, no anxiety  See HPI for Endocrine  ROS    Medical History[2]    Surgical History[3]    Social History     Socioeconomic History    Marital status: Single     Spouse name: Not on file    Number of children: Not on file    Years of education: Not on file    Highest education level: Not on file   Occupational History    Not on file   Tobacco Use    Smoking status: Never     Passive exposure: Past    Smokeless tobacco: Never   Vaping Use    Vaping status: Never Used   Substance and Sexual Activity    Alcohol use: Yes     Alcohol/week: 1.0 standard drink of alcohol     Types: 1 Glasses of wine per week     Comment: Social drinker    Drug use: Never    Sexual activity: Not Currently     Partners: Male     Birth control/protection: Abstinence     Comment: Tubes Tied   Other Topics Concern    Not on file   Social History Narrative    Not on file     Social Drivers of Health     Financial Resource Strain: Medium Risk (11/9/2023)    Received from [facility]    Overall Financial Resource Strain (CARDIA)     Difficulty of Paying Living Expenses: Somewhat hard   Food Insecurity: No Food Insecurity (10/15/2024)    Hunger Vital Sign     Worried About Running Out of Food in the Last Year: Never true     Ran Out of Food in the Last Year: Never true   Transportation Needs: No Transportation Needs (11/9/2023)    Received from [facility]    PRAPARE - Transportation     Lack of Transportation (Medical): No     Lack of Transportation (Non-Medical): No   Physical Activity: Insufficiently Active (11/9/2023)    Received from [facility]    Exercise Vital Sign     Days of Exercise per Week: 1 day     Minutes of Exercise per Session: 30 min   Stress: No Stress Concern Present (10/15/2024)    [place] [employer] - Occupational Stress Questionnaire     Feeling of Stress : Only a little   Social Connections: Moderately Isolated (11/9/2023)    Received from [facility]    Social Connection and Isolation Panel [NHANES]     Frequency of Communication with  Friends and Family: More than three times a week     Frequency of Social Gatherings with Friends and Family: Once a week     Attends Orthodox Services: More than 4 times per year     Active Member of Clubs or Organizations: No     Attends Club or Organization Meetings: Patient declined     Marital Status: Never    Intimate Partner Violence: Not At Risk (10/15/2024)    Humiliation, Afraid, Rape, and Kick questionnaire     Fear of Current or Ex-Partner: No     Emotionally Abused: No     Physically Abused: No     Sexually Abused: No   Housing Stability: High Risk (11/9/2023)    Received from [email or web address]    Housing Stability Vital Sign     Unable to Pay for Housing in the Last Year: Yes     Number of Places Lived in the Last Year: 1     Unstable Housing in the Last Year: No       Objective      Physical Exam:  There were no vitals taken for this visit.  General : alert and oriented X3, no acute distress  Eyes : EOMI     Assessment/Plan   Caity Pastrana is a 45 y.o. female with a hx of obesity and diabetes 2  who presents for follow up for weight management and obesity medicine visit.    A/P Follow up:      Problem List Items Addressed This Visit    None        New Goal  Nutrition- Healthy Plate, Exercise- weight and/or resistance 30 minutes 4 times per week, Medication- increase Mounjaro to 5.0, and Follow-up- 1 MONTH  [facility] Topic: Healthy Eating on a Budget    Dietitian Present during [facility]: Anabela Carter MS, RD, LD    Weight Management : Reviewed the principles of energy metabolism, caloric intake and expenditure, and rationale for a treatment program.  Also reinforced need for reduced calorie, low fat diet and increased physical activity.    Follow up in a group or individual visit as determined.    Valerie Fernandez         [1]   Current Outpatient Medications:     azelastine (Astelin) 137 mcg (0.1 %) nasal spray, Administer 1 spray into each nostril 2 times a day as needed for rhinitis or allergies. Use in  each nostril as directed, Disp: 30 mL, Rfl: 5    busPIRone (Buspar) 7.5 mg tablet, Take 1 tablet (7.5 mg) by mouth once daily., Disp: 30 tablet, Rfl: 11    cetirizine (ZyrTEC) 10 mg tablet, Take 1 tablet (10 mg) by mouth once daily as needed for allergies., Disp: 30 tablet, Rfl: 11    cholecalciferol (Vitamin D-3) 50,000 unit capsule, Take 1 capsule (50,000 Units) by mouth 1 (one) time per week., Disp: 12 capsule, Rfl: 0    cyclobenzaprine (Flexeril) 10 mg tablet, Take 1 tablet (10 mg) by mouth as needed at bedtime for muscle spasms., Disp: 30 tablet, Rfl: 1    dexAMETHasone (Decadron) 1 mg tablet, Take 1 tablet (1 mg) by mouth 1 time for 1 dose. Take 1 tablet at 11 pm, Disp: 1 tablet, Rfl: 0    FLUoxetine (PROzac) 20 mg capsule, Take 1 capsule (20 mg) by mouth once daily., Disp: 30 capsule, Rfl: 1    fluticasone (Flonase) 50 mcg/actuation nasal spray, Administer 1 spray into each nostril 2 times a day. Shake gently. Before first use, prime pump. After use, clean tip and replace cap., Disp: 48 g, Rfl: 3    omeprazole (PriLOSEC) 40 mg DR capsule, Take 1 capsule (40 mg) by mouth once daily in the morning. Take before meals. Do not crush or chew., Disp: 30 capsule, Rfl: 3    tirzepatide (Mounjaro) 2.5 mg/0.5 mL pen injector, Inject 2.5 mg under the skin 1 (one) time per week., Disp: 2 mL, Rfl: 1    zolpidem (Ambien) 10 mg tablet, Take 1 tablet (10 mg) by mouth as needed at bedtime for sleep., Disp: 30 tablet, Rfl: 5  [2]   Past Medical History:  Diagnosis Date    Allergic rhinitis     CTS (carpal tunnel syndrome)     Depression     Dizziness     GERD (gastroesophageal reflux disease)     Migraine     Personal history of other diseases of the digestive system     History of esophageal reflux    Personal history of other diseases of urinary system     History of bladder problems    Personal history of other mental and behavioral disorders     History of depression    Sleep difficulties     Urinary tract infection    [3]    Past Surgical History:  Procedure Laterality Date    BREAST RECONSTRUCTION Bilateral     breast reduction in 2005    CHOLECYSTECTOMY  01/06/2014    Cholecystectomy    COLPOSCOPY      PLANTAR FASCIA SURGERY Right 2013    TUBAL LIGATION  01/06/2014    Tubal Ligation

## 2025-06-27 ENCOUNTER — OFFICE VISIT (OUTPATIENT)
Dept: URGENT CARE | Age: 45
End: 2025-06-27
Payer: COMMERCIAL

## 2025-06-27 VITALS
DIASTOLIC BLOOD PRESSURE: 74 MMHG | HEART RATE: 72 BPM | TEMPERATURE: 98.1 F | RESPIRATION RATE: 18 BRPM | HEIGHT: 68 IN | OXYGEN SATURATION: 98 % | SYSTOLIC BLOOD PRESSURE: 124 MMHG | WEIGHT: 293 LBS | BODY MASS INDEX: 44.41 KG/M2

## 2025-06-27 DIAGNOSIS — Z48.02 VISIT FOR SUTURE REMOVAL: Primary | ICD-10-CM

## 2025-06-27 NOTE — PROGRESS NOTES
Subjective   Patient ID: Caity Pastrana is a 45 y.o. female. They present today with a chief complaint of Suture / Staple Removal (Suture removal on left middle finger.  Sutures were put in 8 days ago.).    History of Present Illness  Patient is a 45-year-old female presenting for suture removal following laceration 8 days ago.  She reports she continues having discharge at the site of the laceration with some areas not healing properly.  She denies redness or swelling to the site of laceration.  She denies fever or chills or any other signs of systemic infection.    Past Medical History  Allergies as of 06/27/2025 - Reviewed 06/27/2025   Allergen Reaction Noted    Amoxicillin-pot clavulanate GI Upset, Headache, and Unknown 05/16/2024    Clindamycin phosphate Unknown 06/27/2025    Doxycycline monohydrate Dizziness, GI Upset, and Headache 05/16/2024    Metformin Blurred vision and Headache 02/28/2024    Nickel Itching 06/14/2013    Oxycodone-acetaminophen Nausea/vomiting 12/26/2024    Penicillins Unknown 06/20/2025    Quinolones Unknown 06/20/2025    Sulfa (sulfonamide antibiotics) Unknown 02/28/2024    Sulfacetamide sodium-sulfur Unknown 06/27/2025    Tramadol Unknown 02/28/2024    Bee pollen Rash 02/28/2024    Levofloxacin Dizziness, GI Upset, Headache, Other, and Palpitations 05/16/2024    Naproxen Rash 12/26/2024       Prescriptions Prior to Admission[1]     Medical History[2]    Surgical History[3]     reports that she has never smoked. She has been exposed to tobacco smoke. She has never used smokeless tobacco. She reports current alcohol use of about 1.0 standard drink of alcohol per week. She reports that she does not use drugs.    Review of Systems  Review of Systems   All other systems reviewed and are negative.                                 Objective    Vitals:    06/27/25 1149   BP: 124/74   Pulse: 72   Resp: 18   Temp: 36.7 °C (98.1 °F)   TempSrc: Oral   SpO2: 98%   Weight: 142 kg (313 lb 14.4 oz)  "  Height: 1.715 m (5' 7.5\")     No LMP recorded. Patient is postmenopausal.    Physical Exam  Vitals reviewed.   General: Vitals Noted. No distress. Normocephalic.  Cardiovascular:     Heart sounds: Normal heart sounds, S1 normal and S2 normal. No murmur heard.     No friction rub.   Pulmonary:      Effort: Pulmonary effort is normal.      Breath sounds: Normal breath sounds and air entry. Lungs clear to auscultation bilaterally   Musculoskeletal: Moves all extremities, no effusion, no edema.  Joint effusion: None  Right upper extremity: No edema.  Left upper extremity: No edema.  Right lower leg: No edema.   Left lower leg: No edema.   Skin:     Comments: 5 sutures in place to the laceration of the left middle finger appearing in healing stage.  No purulent discharge to the site no signs of infection  Neurological:      Cranial Nerves: Cranial nerves 2-12 are intact.      Sensory: No sensory deficit.      Motor: Motor function is intact.      Deep Tendon Reflexes: Reflexes are normal and symmetric.       Procedures    Point of Care Test & Imaging Results from this visit  No results found for this visit on 06/27/25.   Imaging  No results found.    Cardiology, Vascular, and Other Imaging  No other imaging results found for the past 2 days      Diagnostic study results (if any) were reviewed by JAN Payne.    Assessment/Plan   Allergies, medications, history, and pertinent labs/EKGs/Imaging reviewed by JAN Payne.     Medical Decision Making  Patient is alert and orient x 3 no acute distress.  Presents for suture removal to the left middle finger laceration that was applied 7 days ago.  On presentation in evaluation 3 laceration sites with 5 sutures total in place.  The laceration sites over the suture application are in the healing stage with proximal edges.  Mid laceration appears have not close completely but does not have any sutures over the site.  Given duration of ongoing symptoms " since laceration will apply Steri-Strips without adhesive to the wound.  This approach is used with consideration for laceration occurring over 7 days ago.  Patient was advised on closely monitoring for signs and symptoms of infection and return for re evaluation.  Patient was advised on wound home care and significant signs and symptoms were discussed with the patient when to presentation to ER in the setting of systemic infection.    Orders and Diagnoses  There are no diagnoses linked to this encounter.    Medical Admin Record      Patient disposition: Home    Electronically signed by JAN Payne  12:17 PM           [1] (Not in a hospital admission)  [2]   Past Medical History:  Diagnosis Date    Allergic rhinitis     CTS (carpal tunnel syndrome)     Depression     Dizziness     GERD (gastroesophageal reflux disease)     Migraine     Personal history of other diseases of the digestive system     History of esophageal reflux    Personal history of other diseases of urinary system     History of bladder problems    Personal history of other mental and behavioral disorders     History of depression    Sleep difficulties     Urinary tract infection    [3]   Past Surgical History:  Procedure Laterality Date    BREAST RECONSTRUCTION Bilateral     breast reduction in 2005    CHOLECYSTECTOMY  01/06/2014    Cholecystectomy    COLPOSCOPY      PLANTAR FASCIA SURGERY Right 2013    TUBAL LIGATION  01/06/2014    Tubal Ligation

## 2025-06-28 ENCOUNTER — TELEPHONE (OUTPATIENT)
Dept: URGENT CARE | Age: 45
End: 2025-06-28

## 2025-06-29 RX ORDER — TIRZEPATIDE 5 MG/.5ML
5 INJECTION, SOLUTION SUBCUTANEOUS
Qty: 2 ML | Refills: 5 | Status: SHIPPED | OUTPATIENT
Start: 2025-06-29

## 2025-06-30 ENCOUNTER — TELEPHONE (OUTPATIENT)
Dept: ENDOCRINOLOGY | Facility: CLINIC | Age: 45
End: 2025-06-30
Payer: COMMERCIAL

## 2025-06-30 NOTE — TELEPHONE ENCOUNTER
Prior Auth for mounjaro pending determination  Submitted on 6/30 via [facility]    KEY: [id number]

## 2025-06-30 NOTE — PATIENT INSTRUCTIONS
New Goal  Nutrition- Healthy Plate, Exercise- weight and/or resistance 30 minutes 4 times per week, Medication- increase Mounjaro to 5.0, and Follow-up- 1 MONTH

## 2025-07-02 ENCOUNTER — APPOINTMENT (OUTPATIENT)
Dept: ENDOCRINOLOGY | Facility: CLINIC | Age: 45
End: 2025-07-02
Payer: COMMERCIAL

## 2025-07-07 ENCOUNTER — APPOINTMENT (OUTPATIENT)
Dept: PRIMARY CARE | Facility: CLINIC | Age: 45
End: 2025-07-07
Payer: COMMERCIAL

## 2025-07-07 VITALS
DIASTOLIC BLOOD PRESSURE: 78 MMHG | BODY MASS INDEX: 48.45 KG/M2 | OXYGEN SATURATION: 99 % | SYSTOLIC BLOOD PRESSURE: 122 MMHG | WEIGHT: 293 LBS | HEART RATE: 81 BPM | TEMPERATURE: 97.4 F

## 2025-07-07 DIAGNOSIS — E11.65 TYPE 2 DIABETES MELLITUS WITH HYPERGLYCEMIA, WITHOUT LONG-TERM CURRENT USE OF INSULIN: ICD-10-CM

## 2025-07-07 DIAGNOSIS — J30.1 SEASONAL ALLERGIC RHINITIS DUE TO POLLEN: ICD-10-CM

## 2025-07-07 DIAGNOSIS — Z12.31 BREAST CANCER SCREENING BY MAMMOGRAM: ICD-10-CM

## 2025-07-07 DIAGNOSIS — M54.50 ACUTE LOW BACK PAIN, UNSPECIFIED BACK PAIN LATERALITY, UNSPECIFIED WHETHER SCIATICA PRESENT: ICD-10-CM

## 2025-07-07 DIAGNOSIS — J30.81 ALLERGIC RHINITIS DUE TO ANIMAL HAIR AND DANDER: ICD-10-CM

## 2025-07-07 DIAGNOSIS — S61.213S LACERATION OF LEFT MIDDLE FINGER WITHOUT FOREIGN BODY WITHOUT DAMAGE TO NAIL, SEQUELA: Primary | ICD-10-CM

## 2025-07-07 DIAGNOSIS — K59.04 CHRONIC IDIOPATHIC CONSTIPATION: ICD-10-CM

## 2025-07-07 PROCEDURE — RXMED WILLOW AMBULATORY MEDICATION CHARGE

## 2025-07-07 PROCEDURE — 99214 OFFICE O/P EST MOD 30 MIN: CPT | Performed by: INTERNAL MEDICINE

## 2025-07-07 RX ORDER — CETIRIZINE HYDROCHLORIDE 10 MG/1
10 TABLET ORAL DAILY PRN
Qty: 90 TABLET | Refills: 3 | Status: SHIPPED | OUTPATIENT
Start: 2025-07-07 | End: 2026-07-07

## 2025-07-07 RX ORDER — CYCLOBENZAPRINE HCL 10 MG
10 TABLET ORAL NIGHTLY PRN
Qty: 30 TABLET | Refills: 1 | Status: SHIPPED | OUTPATIENT
Start: 2025-07-07 | End: 2025-09-05

## 2025-07-07 RX ORDER — TIRZEPATIDE 5 MG/.5ML
5 INJECTION, SOLUTION SUBCUTANEOUS
Qty: 2 ML | Refills: 5 | Status: SHIPPED | OUTPATIENT
Start: 2025-07-07

## 2025-07-07 RX ORDER — POLYETHYLENE GLYCOL 3350 17 G/17G
17 POWDER, FOR SOLUTION ORAL DAILY PRN
Qty: 510 G | Refills: 3 | Status: SHIPPED | OUTPATIENT
Start: 2025-07-07 | End: 2025-11-04

## 2025-07-07 ASSESSMENT — PATIENT HEALTH QUESTIONNAIRE - PHQ9
1. LITTLE INTEREST OR PLEASURE IN DOING THINGS: NOT AT ALL
SUM OF ALL RESPONSES TO PHQ9 QUESTIONS 1 AND 2: 0
2. FEELING DOWN, DEPRESSED OR HOPELESS: NOT AT ALL

## 2025-07-07 ASSESSMENT — PAIN SCALES - GENERAL: PAINLEVEL_OUTOF10: 0-NO PAIN

## 2025-07-07 NOTE — PROGRESS NOTES
Subjective   Patient ID: Caity Pastrana is a 45 y.o. female who presents for Follow-up (ER FOLLOW UP/).    This is a 45 y.o. who who suffered a LMF laceration due to injury with a hedger. She had sutures placed on 6/20 and had them removed 6/27. Pt is concerned that she may have infection in her  finger because the skin is not completely healed.  She is also c/o chronic constipation that has been exacerbated by Mounjaro.   She also needs med RF.         Review of Systems   Constitutional:  Positive for activity change and appetite change. Negative for chills, fever and unexpected weight change.   Gastrointestinal:  Positive for constipation. Negative for abdominal pain, nausea and vomiting.   Endocrine: Negative for polydipsia, polyphagia and polyuria.   Musculoskeletal:         See HPI   Neurological:  Positive for numbness. Negative for weakness.       Objective   /78   Pulse 81   Temp 36.3 °C (97.4 °F)   Wt 142 kg (314 lb)   SpO2 99%   BMI 48.45 kg/m²     Physical Exam  Vitals reviewed.   Constitutional:       General: She is not in acute distress.     Appearance: Normal appearance. She is obese. She is not ill-appearing.   Cardiovascular:      Rate and Rhythm: Normal rate and regular rhythm.      Heart sounds: Normal heart sounds.   Pulmonary:      Effort: Pulmonary effort is normal.      Breath sounds: Normal breath sounds.   Skin:     General: Skin is warm and dry.      Comments: Healing, dried skin on tip of LMF; no erythema or drainage   Neurological:      General: No focal deficit present.      Mental Status: She is alert and oriented to person, place, and time.         Assessment/Plan   Diagnoses and all orders for this visit:  Laceration of left middle finger without foreign body without damage to nail, sequela  Comments:  Supportive care. Keep area clean and dry.  records reviewed.  Acute low back pain, unspecified back pain laterality, unspecified whether sciatica present  -      cyclobenzaprine (Flexeril) 10 mg tablet; Take 1 tablet (10 mg) by mouth as needed at bedtime for muscle spasms.  Seasonal allergic rhinitis due to pollen  -     cetirizine (ZyrTEC) 10 mg tablet; Take 1 tablet (10 mg) by mouth once daily as needed for allergies.  Allergic rhinitis due to animal hair and dander  -     cetirizine (ZyrTEC) 10 mg tablet; Take 1 tablet (10 mg) by mouth once daily as needed for allergies.  Chronic idiopathic constipation  -     polyethylene glycol (Glycolax, Miralax) 17 gram/dose powder; Mix 17 g of powder and drink once daily as needed for constipation.  Breast cancer screening by mammogram  -     BI mammo bilateral screening tomosynthesis; Future

## 2025-07-08 ASSESSMENT — ENCOUNTER SYMPTOMS
ACTIVITY CHANGE: 1
ABDOMINAL PAIN: 0
APPETITE CHANGE: 1
WEAKNESS: 0
POLYPHAGIA: 0
UNEXPECTED WEIGHT CHANGE: 0
NUMBNESS: 1
POLYDIPSIA: 0
FEVER: 0
CHILLS: 0
CONSTIPATION: 1
NAUSEA: 0
VOMITING: 0

## 2025-07-10 ENCOUNTER — PHARMACY VISIT (OUTPATIENT)
Dept: PHARMACY | Facility: CLINIC | Age: 45
End: 2025-07-10
Payer: COMMERCIAL

## 2025-07-22 ENCOUNTER — APPOINTMENT (OUTPATIENT)
Age: 45
End: 2025-07-22
Payer: COMMERCIAL

## 2025-07-22 ENCOUNTER — HOSPITAL ENCOUNTER (OUTPATIENT)
Dept: RADIOLOGY | Facility: CLINIC | Age: 45
Discharge: HOME | End: 2025-07-22
Payer: COMMERCIAL

## 2025-07-22 VITALS — BODY MASS INDEX: 45.99 KG/M2 | WEIGHT: 293 LBS | HEIGHT: 67 IN

## 2025-07-22 DIAGNOSIS — Z12.31 BREAST CANCER SCREENING BY MAMMOGRAM: ICD-10-CM

## 2025-07-22 PROCEDURE — 77067 SCR MAMMO BI INCL CAD: CPT | Performed by: RADIOLOGY

## 2025-07-22 PROCEDURE — 77063 BREAST TOMOSYNTHESIS BI: CPT | Performed by: RADIOLOGY

## 2025-07-22 PROCEDURE — 77063 BREAST TOMOSYNTHESIS BI: CPT

## 2025-08-04 PROCEDURE — RXMED WILLOW AMBULATORY MEDICATION CHARGE

## 2025-08-05 ENCOUNTER — PHARMACY VISIT (OUTPATIENT)
Dept: PHARMACY | Facility: CLINIC | Age: 45
End: 2025-08-05
Payer: COMMERCIAL

## 2025-08-09 ENCOUNTER — APPOINTMENT (OUTPATIENT)
Dept: RADIOLOGY | Facility: HOSPITAL | Age: 45
End: 2025-08-09
Payer: COMMERCIAL

## 2025-08-09 ENCOUNTER — HOSPITAL ENCOUNTER (EMERGENCY)
Facility: HOSPITAL | Age: 45
Discharge: HOME | End: 2025-08-10
Payer: COMMERCIAL

## 2025-08-09 DIAGNOSIS — T18.108A FOREIGN BODY IN ESOPHAGUS, INITIAL ENCOUNTER: Primary | ICD-10-CM

## 2025-08-09 PROCEDURE — 2500000004 HC RX 250 GENERAL PHARMACY W/ HCPCS (ALT 636 FOR OP/ED): Mod: JZ

## 2025-08-09 PROCEDURE — 99284 EMERGENCY DEPT VISIT MOD MDM: CPT

## 2025-08-09 PROCEDURE — 70360 X-RAY EXAM OF NECK: CPT

## 2025-08-09 PROCEDURE — 96372 THER/PROPH/DIAG INJ SC/IM: CPT

## 2025-08-09 PROCEDURE — 70360 X-RAY EXAM OF NECK: CPT | Performed by: RADIOLOGY

## 2025-08-09 RX ORDER — LIDOCAINE HYDROCHLORIDE 20 MG/ML
15 SOLUTION OROPHARYNGEAL ONCE
Status: COMPLETED | OUTPATIENT
Start: 2025-08-09 | End: 2025-08-10

## 2025-08-09 RX ORDER — ALUMINUM HYDROXIDE, MAGNESIUM HYDROXIDE, AND SIMETHICONE 1200; 120; 1200 MG/30ML; MG/30ML; MG/30ML
30 SUSPENSION ORAL ONCE
Status: COMPLETED | OUTPATIENT
Start: 2025-08-09 | End: 2025-08-10

## 2025-08-09 RX ADMIN — GLUCAGON 1 MG: 1 INJECTION, POWDER, LYOPHILIZED, FOR SOLUTION INTRAMUSCULAR; INTRAVENOUS at 23:18

## 2025-08-09 ASSESSMENT — LIFESTYLE VARIABLES
EVER FELT BAD OR GUILTY ABOUT YOUR DRINKING: NO
HAVE YOU EVER FELT YOU SHOULD CUT DOWN ON YOUR DRINKING: NO
TOTAL SCORE: 0
HAVE PEOPLE ANNOYED YOU BY CRITICIZING YOUR DRINKING: NO
EVER HAD A DRINK FIRST THING IN THE MORNING TO STEADY YOUR NERVES TO GET RID OF A HANGOVER: NO

## 2025-08-09 ASSESSMENT — PAIN SCALES - GENERAL: PAINLEVEL_OUTOF10: 3

## 2025-08-09 ASSESSMENT — PAIN - FUNCTIONAL ASSESSMENT: PAIN_FUNCTIONAL_ASSESSMENT: 0-10

## 2025-08-10 VITALS
SYSTOLIC BLOOD PRESSURE: 124 MMHG | BODY MASS INDEX: 45.99 KG/M2 | DIASTOLIC BLOOD PRESSURE: 82 MMHG | TEMPERATURE: 96.8 F | HEART RATE: 75 BPM | RESPIRATION RATE: 16 BRPM | WEIGHT: 293 LBS | OXYGEN SATURATION: 97 % | HEIGHT: 67 IN

## 2025-08-10 PROCEDURE — 2500000001 HC RX 250 WO HCPCS SELF ADMINISTERED DRUGS (ALT 637 FOR MEDICARE OP)

## 2025-08-10 PROCEDURE — 2500000005 HC RX 250 GENERAL PHARMACY W/O HCPCS

## 2025-08-10 RX ADMIN — LIDOCAINE HYDROCHLORIDE 15 ML: 20 SOLUTION ORAL at 00:01

## 2025-08-10 RX ADMIN — ALUMINUM HYDROXIDE, MAGNESIUM HYDROXIDE, AND SIMETHICONE 30 ML: 200; 200; 20 SUSPENSION ORAL at 00:01

## 2025-08-10 NOTE — DISCHARGE INSTRUCTIONS
Return to ED if symptoms do not improve or worsen.    Thank you for you patience and cooperation today. Please read all aftercare instructions.    Be sure to take all medications, over the counter medications or prescription medications only as directed. Be sure to follow up as directed.  All of the details of your follow up instructions are detailed in the follow up section of this packet.    PAIN MEDICATIONS:  If you are being discharged with any pains medications or muscle relaxers (norco, Vicodin, hydrocodone products, Percocet, oxycodone products, flexeril, cyclobenzaprine, robaxin, norflex, brand or generic, or any other pain controlling medications with the exception of Ibuprofen and regular Tylenol, do not drive or operate machinery, climb ladders or participate in any activity that could potentially put yourself or others at risk should you get dizzy, or be/feel impaired at all.      RETURN PRECAUTIONS:  Return to emergency room without delay for ANY new or worsening pains or for any other symptoms or concerns.  Return with worsening pains, nausea, vomiting, trouble breathing, palpitations, shortness of breath, inability to pass stool or urine, loss of control of stool or urine, any numbness or tingling (that is not normal for you), uncontrolled fevers, the passing of blood or other material in stool or urine, rashes, pains or for any other symptoms or concerns you may have.  You are always welcome to return to the ER at any time for any reason or for any other concerns you may have.

## 2025-08-12 DIAGNOSIS — F32.A DEPRESSION, UNSPECIFIED DEPRESSION TYPE: ICD-10-CM

## 2025-08-12 RX ORDER — FLUOXETINE 20 MG/1
20 CAPSULE ORAL DAILY
Qty: 30 CAPSULE | Refills: 1 | Status: SHIPPED | OUTPATIENT
Start: 2025-08-12

## 2025-08-12 NOTE — ED PROVIDER NOTES
HPI   Chief Complaint   Patient presents with    Swallowed Foreign Body     Swallowed a piece of permanent retainer, x 40 mins ago. Stuck in throat       HPI  Is a 45-year-old female presents ED for swallowed foreign body.  Patient states a small piece of wire from her retainer was accidentally swallowed, she states it feels stuck in her esophagus.  She is tolerating her own secretions.  Not nauseous, not vomiting.  Not in any respiratory distress, denies any airway symptoms.  No other acute complaints.      Patient History   Medical History[1]  Surgical History[2]  Family History[3]  Social History[4]    Physical Exam   ED Triage Vitals   Temperature Heart Rate Respirations BP   08/09/25 2256 08/09/25 2258 08/09/25 2258 08/09/25 2258   36 °C (96.8 °F) 88 14 126/84      Pulse Ox Temp Source Heart Rate Source Patient Position   08/09/25 2258 08/09/25 2256 -- --   97 % Temporal        BP Location FiO2 (%)     -- --             Physical Exam  Vitals reviewed.   Constitutional:       General: She is not in acute distress.     Appearance: Normal appearance. She is not ill-appearing.   HENT:      Head: Normocephalic and atraumatic.   Eyes:      Extraocular Movements: Extraocular movements intact.   Cardiovascular:      Rate and Rhythm: Normal rate and regular rhythm.      Heart sounds: Normal heart sounds.   Pulmonary:      Effort: Pulmonary effort is normal.      Breath sounds: Normal breath sounds.   Abdominal:      Palpations: Abdomen is soft.      Tenderness: There is no abdominal tenderness.   Musculoskeletal:         General: Normal range of motion.      Cervical back: Normal range of motion and neck supple.   Skin:     General: Skin is warm and dry.   Neurological:      General: No focal deficit present.      Mental Status: She is alert and oriented to person, place, and time.   Psychiatric:         Mood and Affect: Mood normal.         Behavior: Behavior normal.    ED Course & MDM   Diagnoses as of 08/12/25 3798    Foreign body in esophagus, initial encounter                 No data recorded                                 Medical Decision Making  Parts of this chart have been completed using voice recognition software. Please excuse any errors of transcription.  My thought process and reason for plan has been formulated from the time that I saw the patient until the time of disposition and is not specific to one specific moment during their visit and furthermore my MDM encompasses this entire chart and not only this text box.    HPI:   A medically appropriate HPI was obtained, outlined above.    Caity Pastrana is a  45 y.o. female    Chief Complaint   Patient presents with    Swallowed Foreign Body     Swallowed a piece of permanent retainer, x 40 mins ago. Stuck in throat       Medical History[5]    Surgical History[6]    Social History[7]    Family History[8]    Allergies[9]    Current Outpatient Medications   Medication Instructions    azelastine (Astelin) 137 mcg (0.1 %) nasal spray 1 spray, Each Nostril, 2 times daily PRN, Use in each nostril as directed    cetirizine (ZYRTEC) 10 mg, oral, Daily PRN    cholecalciferol (VITAMIN D-3) 50,000 Units, oral, Weekly    cyclobenzaprine (FLEXERIL) 10 mg, oral, Nightly PRN    dexAMETHasone (DECADRON) 1 mg, oral, Once, Take 1 tablet at 11 pm    FLUoxetine (PROZAC) 20 mg, oral, Daily    fluticasone (Flonase) 50 mcg/actuation nasal spray 1 spray, Each Nostril, 2 times daily, Shake gently. Before first use, prime pump. After use, clean tip and replace cap.    Mounjaro 5 mg, subcutaneous, Once Weekly    omeprazole (PRILOSEC) 40 mg, oral, Daily before breakfast, Do not crush or chew.    polyethylene glycol (GLYCOLAX, MIRALAX) 17 g, oral, Daily PRN    zolpidem (AMBIEN) 10 mg, oral, Nightly PRN   for details    Exam:   No data found.    A medically appropriate exam performed, outlined above, given the known history and presentation.    EKG/Cardiac monitor:   If EKG was done and, it was  interpreted by attending physician, see their note for ED course for more detail.    Medications given during visit:  Medications   glucagon (Glucagen) injection 1 mg (1 mg intramuscular Given 8/9/25 4729)   alum-mag hydroxide-simeth (Mylanta) 200-200-20 mg/5 mL oral suspension 30 mL (30 mL oral Given 8/10/25 0001)   lidocaine (Xylocaine) 2 % mouth solution 15 mL (15 mL oral Given 8/10/25 0001)        Diagnostic/tests:  Labs Reviewed - No data to display     XR neck soft tissue   Final Result   No acute radiographic abnormality of the neck soft tissues.   No radiopaque foreign body.        MACRO:   None.        Signed by: Evan Finkelstein 8/9/2025 11:34 PM   Dictation workstation:   [id number]             [facility] Summary:  No radiopaque foreign body on x-ray today.  Patient denies any change in sensation after GI cocktail.  GI was consulted, recommendations are watchful waiting at this point.  Patient may return to ED if any new or worsening symptoms.    We have discussed the diagnosis and risks, and we agree with discharging home to follow-up with appropriate physician as directed. We also discussed returning to the Emergency Department immediately if new or worsening symptoms occur. We have discussed the symptoms which are most concerning that necessitate immediate return. Pt symptoms have been well controlled here and the patient is safe for discharge with appropriate outpatient follow up. The patient has verbalized understanding to return to ER without delay for new or worsening pains or for any other symptoms or concerns. I utilized the discharge clinical management tool provided [employer] to help estimate risk of negative outcome for this patient.        Disposition:  ED Prescriptions    None         All of the patient's questions were answered to the best of my ability. Patient states understanding that they have been screened for an emergency today and we have not found any etiology of symptoms that  requires emergent treatment or admission to the hospital at this point. They understand that they may have not had definitive care today and require follow-up for treatment of their condition. They also state understanding that they may have an emergent condition that may potentially have not of detected at this visit and they must return to the emergency department if they develop any worsening of symptoms or new complaints.       Procedure  Procedures       [1]   Past Medical History:  Diagnosis Date    Allergic rhinitis     CTS (carpal tunnel syndrome)     Depression     Dizziness     GERD (gastroesophageal reflux disease)     Migraine     Personal history of other diseases of the digestive system     History of esophageal reflux    Personal history of other diseases of urinary system     History of bladder problems    Personal history of other mental and behavioral disorders     History of depression    Sleep difficulties     Urinary tract infection    [2]   Past Surgical History:  Procedure Laterality Date    BREAST RECONSTRUCTION Bilateral     breast reduction in 2005    CHOLECYSTECTOMY  01/06/2014    Cholecystectomy    COLPOSCOPY      PLANTAR FASCIA SURGERY Right 2013    REDUCTION MAMMAPLASTY  05/2004    TUBAL LIGATION  01/06/2014    Tubal Ligation   [3]   Family History  Problem Relation Name Age of Onset    Arthritis Mother Nazia Pastrana     Breast cancer Mother Nazia Pastrana     Other (Ovarian cancer) Mother Nazia Pastrana     Diabetes Mother Nazia Pastrana     Mental illness Mother Nazia Pastrana     Ovarian cancer Mother Nazia Pastrana     Cancer Mother Nazia Pastrana     Neuropathy Mother Nazia Pastrana    [4]   Social History  Tobacco Use    Smoking status: Never     Passive exposure: Past    Smokeless tobacco: Never   Vaping Use    Vaping status: Never Used   Substance Use Topics    Alcohol use: Yes     Alcohol/week: 1.0 standard drink of alcohol     Types: 1 Glasses of wine per week     Comment: Social drinker    Drug  use: Never   [5]   Past Medical History:  Diagnosis Date    Allergic rhinitis     CTS (carpal tunnel syndrome)     Depression     Dizziness     GERD (gastroesophageal reflux disease)     Migraine     Personal history of other diseases of the digestive system     History of esophageal reflux    Personal history of other diseases of urinary system     History of bladder problems    Personal history of other mental and behavioral disorders     History of depression    Sleep difficulties     Urinary tract infection    [6]   Past Surgical History:  Procedure Laterality Date    BREAST RECONSTRUCTION Bilateral     breast reduction in 2005    CHOLECYSTECTOMY  01/06/2014    Cholecystectomy    COLPOSCOPY      PLANTAR FASCIA SURGERY Right 2013    REDUCTION MAMMAPLASTY  05/2004    TUBAL LIGATION  01/06/2014    Tubal Ligation   [7]   Social History  Tobacco Use    Smoking status: Never     Passive exposure: Past    Smokeless tobacco: Never   Vaping Use    Vaping status: Never Used   Substance Use Topics    Alcohol use: Yes     Alcohol/week: 1.0 standard drink of alcohol     Types: 1 Glasses of wine per week     Comment: Social drinker    Drug use: Never   [8]   Family History  Problem Relation Name Age of Onset    Arthritis Mother Nazia Pastrana     Breast cancer Mother Nazia Pastrana     Other (Ovarian cancer) Mother Nazia Pastrana     Diabetes Mother Nazia Pastrana     Mental illness Mother Nazia Pastrana     Ovarian cancer Mother Nazia Pastrana     Cancer Mother Nazia Pastrana     Neuropathy Mother Nazia Pastrana    [9]   Allergies  Allergen Reactions    Amoxicillin-Pot Clavulanate GI Upset, Headache and Unknown    Clindamycin Phosphate Unknown    Doxycycline Monohydrate Dizziness, GI Upset and Headache    Metformin Blurred vision and Headache    Nickel Itching    Oxycodone-Acetaminophen Nausea/vomiting    Penicillins Unknown    Quinolones Unknown    Sulfa (Sulfonamide Antibiotics) Unknown    Sulfacetamide Sodium-Sulfur Unknown    Tramadol Unknown     Bee Pollen Rash    Levofloxacin Dizziness, GI Upset, Headache, Other and Palpitations    Naproxen Rash        Jarrett Pruitt PA-C  08/12/25 9324

## 2025-08-13 ENCOUNTER — OFFICE VISIT (OUTPATIENT)
Dept: PRIMARY CARE | Facility: CLINIC | Age: 45
End: 2025-08-13
Payer: COMMERCIAL

## 2025-08-13 VITALS
DIASTOLIC BLOOD PRESSURE: 80 MMHG | BODY MASS INDEX: 47.77 KG/M2 | SYSTOLIC BLOOD PRESSURE: 126 MMHG | TEMPERATURE: 96.4 F | OXYGEN SATURATION: 98 % | WEIGHT: 293 LBS | HEART RATE: 84 BPM

## 2025-08-13 DIAGNOSIS — G47.00 INSOMNIA, UNSPECIFIED TYPE: ICD-10-CM

## 2025-08-13 DIAGNOSIS — G43.009 MIGRAINE WITHOUT AURA AND WITHOUT STATUS MIGRAINOSUS, NOT INTRACTABLE: Primary | ICD-10-CM

## 2025-08-13 DIAGNOSIS — J01.90 ACUTE NON-RECURRENT SINUSITIS, UNSPECIFIED LOCATION: ICD-10-CM

## 2025-08-13 DIAGNOSIS — E11.9 TYPE 2 DIABETES MELLITUS WITHOUT COMPLICATION, WITHOUT LONG-TERM CURRENT USE OF INSULIN: ICD-10-CM

## 2025-08-13 PROCEDURE — 99214 OFFICE O/P EST MOD 30 MIN: CPT | Performed by: INTERNAL MEDICINE

## 2025-08-13 PROCEDURE — 3079F DIAST BP 80-89 MM HG: CPT | Performed by: INTERNAL MEDICINE

## 2025-08-13 PROCEDURE — 3044F HG A1C LEVEL LT 7.0%: CPT | Performed by: INTERNAL MEDICINE

## 2025-08-13 PROCEDURE — 3074F SYST BP LT 130 MM HG: CPT | Performed by: INTERNAL MEDICINE

## 2025-08-13 RX ORDER — CEPHALEXIN 500 MG/1
500 CAPSULE ORAL 2 TIMES DAILY
Qty: 20 CAPSULE | Refills: 0 | Status: SHIPPED | OUTPATIENT
Start: 2025-08-13 | End: 2025-08-24

## 2025-08-13 RX ORDER — KETOROLAC TROMETHAMINE 30 MG/ML
60 INJECTION, SOLUTION INTRAMUSCULAR; INTRAVENOUS ONCE
Status: COMPLETED | OUTPATIENT
Start: 2025-08-13 | End: 2025-08-13

## 2025-08-13 RX ORDER — ZOLPIDEM TARTRATE 12.5 MG/1
12.5 TABLET, FILM COATED, EXTENDED RELEASE ORAL NIGHTLY PRN
Qty: 90 TABLET | Refills: 1 | Status: SHIPPED | OUTPATIENT
Start: 2025-08-13

## 2025-08-13 RX ADMIN — KETOROLAC TROMETHAMINE 60 MG: 30 INJECTION, SOLUTION INTRAMUSCULAR; INTRAVENOUS at 15:55

## 2025-08-13 ASSESSMENT — PAIN SCALES - GENERAL: PAINLEVEL_OUTOF10: 0-NO PAIN

## 2025-08-14 LAB
ALBUMIN/CREAT UR: 3 MG/G CREAT
CREAT UR-MCNC: 170 MG/DL (ref 20–275)
MICROALBUMIN UR-MCNC: 0.5 MG/DL

## 2025-08-14 ASSESSMENT — ENCOUNTER SYMPTOMS
SHORTNESS OF BREATH: 0
FEVER: 0
UNEXPECTED WEIGHT CHANGE: 0
VOMITING: 0
CHILLS: 0
APPETITE CHANGE: 1
COUGH: 0
FATIGUE: 1
POLYDIPSIA: 0
NAUSEA: 0
ACTIVITY CHANGE: 0
SINUS PRESSURE: 1
POLYPHAGIA: 0
HEADACHES: 1
DIZZINESS: 0

## 2025-09-03 ENCOUNTER — PATIENT MESSAGE (OUTPATIENT)
Dept: OBSTETRICS AND GYNECOLOGY | Facility: CLINIC | Age: 45
End: 2025-09-03
Payer: COMMERCIAL

## 2025-09-03 PROCEDURE — RXMED WILLOW AMBULATORY MEDICATION CHARGE

## 2025-09-05 ENCOUNTER — PHARMACY VISIT (OUTPATIENT)
Dept: PHARMACY | Facility: CLINIC | Age: 45
End: 2025-09-05
Payer: COMMERCIAL

## 2025-09-06 ENCOUNTER — OFFICE VISIT (OUTPATIENT)
Dept: URGENT CARE | Age: 45
End: 2025-09-06
Payer: COMMERCIAL

## 2025-09-06 VITALS
OXYGEN SATURATION: 100 % | TEMPERATURE: 97.8 F | BODY MASS INDEX: 44.41 KG/M2 | SYSTOLIC BLOOD PRESSURE: 105 MMHG | WEIGHT: 293 LBS | HEIGHT: 68 IN | DIASTOLIC BLOOD PRESSURE: 56 MMHG | HEART RATE: 80 BPM

## 2025-09-06 DIAGNOSIS — T63.441A BEE STING, ACCIDENTAL OR UNINTENTIONAL, INITIAL ENCOUNTER: Primary | ICD-10-CM

## 2025-09-06 RX ORDER — METHYLPREDNISOLONE 4 MG/1
TABLET ORAL
Qty: 21 TABLET | Refills: 0 | Status: SHIPPED | OUTPATIENT
Start: 2025-09-06 | End: 2025-09-12

## 2025-10-02 ENCOUNTER — APPOINTMENT (OUTPATIENT)
Dept: DERMATOLOGY | Facility: CLINIC | Age: 45
End: 2025-10-02
Payer: COMMERCIAL

## 2025-10-15 ENCOUNTER — APPOINTMENT (OUTPATIENT)
Dept: ENDOCRINOLOGY | Facility: CLINIC | Age: 45
End: 2025-10-15
Payer: COMMERCIAL

## 2025-11-07 ENCOUNTER — APPOINTMENT (OUTPATIENT)
Dept: GASTROENTEROLOGY | Facility: HOSPITAL | Age: 45
End: 2025-11-07
Payer: COMMERCIAL

## 2025-11-14 ENCOUNTER — APPOINTMENT (OUTPATIENT)
Dept: GASTROENTEROLOGY | Facility: HOSPITAL | Age: 45
End: 2025-11-14
Payer: COMMERCIAL